# Patient Record
Sex: FEMALE | Race: WHITE | URBAN - METROPOLITAN AREA
[De-identification: names, ages, dates, MRNs, and addresses within clinical notes are randomized per-mention and may not be internally consistent; named-entity substitution may affect disease eponyms.]

---

## 2017-01-01 ENCOUNTER — OFFICE VISIT (OUTPATIENT)
Dept: INTERNAL MEDICINE | Facility: CLINIC | Age: 82
End: 2017-01-01
Payer: COMMERCIAL

## 2017-01-01 VITALS
OXYGEN SATURATION: 91 % | WEIGHT: 144.8 LBS | SYSTOLIC BLOOD PRESSURE: 112 MMHG | BODY MASS INDEX: 26.48 KG/M2 | HEART RATE: 100 BPM | DIASTOLIC BLOOD PRESSURE: 76 MMHG | TEMPERATURE: 98.2 F

## 2017-01-01 DIAGNOSIS — Z23 NEED FOR PROPHYLACTIC VACCINATION AGAINST STREPTOCOCCUS PNEUMONIAE (PNEUMOCOCCUS): ICD-10-CM

## 2017-01-01 DIAGNOSIS — I63.9 CEREBROVASCULAR ACCIDENT (CVA), UNSPECIFIED MECHANISM (H): ICD-10-CM

## 2017-01-01 DIAGNOSIS — I63.50 CEREBROVASCULAR ACCIDENT (CVA) DUE TO STENOSIS OF CEREBRAL ARTERY (H): ICD-10-CM

## 2017-01-01 DIAGNOSIS — F33.1 MAJOR DEPRESSIVE DISORDER, RECURRENT EPISODE, MODERATE (H): Primary | ICD-10-CM

## 2017-01-01 DIAGNOSIS — J61 ASBESTOSIS (H): ICD-10-CM

## 2017-01-01 DIAGNOSIS — J43.1 PANLOBULAR EMPHYSEMA (H): ICD-10-CM

## 2017-01-01 DIAGNOSIS — F51.01 PRIMARY INSOMNIA: ICD-10-CM

## 2017-01-01 DIAGNOSIS — F01.50 VASCULAR DEMENTIA WITHOUT BEHAVIORAL DISTURBANCE (H): ICD-10-CM

## 2017-01-01 DIAGNOSIS — I10 BENIGN ESSENTIAL HYPERTENSION: ICD-10-CM

## 2017-01-01 DIAGNOSIS — E78.5 HYPERLIPIDEMIA LDL GOAL <100: ICD-10-CM

## 2017-01-01 LAB
ALBUMIN SERPL-MCNC: 3.3 G/DL (ref 3.4–5)
ALP SERPL-CCNC: 81 U/L (ref 40–150)
ALT SERPL W P-5'-P-CCNC: 17 U/L (ref 0–50)
ANION GAP SERPL CALCULATED.3IONS-SCNC: 6 MMOL/L (ref 3–14)
AST SERPL W P-5'-P-CCNC: 9 U/L (ref 0–45)
BASOPHILS # BLD AUTO: 0 10E9/L (ref 0–0.2)
BASOPHILS NFR BLD AUTO: 0.3 %
BILIRUB SERPL-MCNC: 0.4 MG/DL (ref 0.2–1.3)
BUN SERPL-MCNC: 8 MG/DL (ref 7–30)
CALCIUM SERPL-MCNC: 8.6 MG/DL (ref 8.5–10.1)
CHLORIDE SERPL-SCNC: 107 MMOL/L (ref 94–109)
CHOLEST SERPL-MCNC: 130 MG/DL
CO2 SERPL-SCNC: 29 MMOL/L (ref 20–32)
CREAT SERPL-MCNC: 0.74 MG/DL (ref 0.52–1.04)
DIFFERENTIAL METHOD BLD: ABNORMAL
EOSINOPHIL # BLD AUTO: 0.2 10E9/L (ref 0–0.7)
EOSINOPHIL NFR BLD AUTO: 2.4 %
ERYTHROCYTE [DISTWIDTH] IN BLOOD BY AUTOMATED COUNT: 15.2 % (ref 10–15)
GFR SERPL CREATININE-BSD FRML MDRD: 75 ML/MIN/1.7M2
GLUCOSE SERPL-MCNC: 88 MG/DL (ref 70–99)
HCT VFR BLD AUTO: 42.9 % (ref 35–47)
HDLC SERPL-MCNC: 52 MG/DL
HGB BLD-MCNC: 13.1 G/DL (ref 11.7–15.7)
LDLC SERPL CALC-MCNC: 56 MG/DL
LYMPHOCYTES # BLD AUTO: 1.4 10E9/L (ref 0.8–5.3)
LYMPHOCYTES NFR BLD AUTO: 15.2 %
MCH RBC QN AUTO: 29.1 PG (ref 26.5–33)
MCHC RBC AUTO-ENTMCNC: 30.5 G/DL (ref 31.5–36.5)
MCV RBC AUTO: 95 FL (ref 78–100)
MONOCYTES # BLD AUTO: 0.7 10E9/L (ref 0–1.3)
MONOCYTES NFR BLD AUTO: 7.7 %
NEUTROPHILS # BLD AUTO: 7 10E9/L (ref 1.6–8.3)
NEUTROPHILS NFR BLD AUTO: 74.4 %
NONHDLC SERPL-MCNC: 78 MG/DL
PLATELET # BLD AUTO: 252 10E9/L (ref 150–450)
POTASSIUM SERPL-SCNC: 3.9 MMOL/L (ref 3.4–5.3)
PROT SERPL-MCNC: 7.1 G/DL (ref 6.8–8.8)
RBC # BLD AUTO: 4.5 10E12/L (ref 3.8–5.2)
SODIUM SERPL-SCNC: 142 MMOL/L (ref 133–144)
TRIGL SERPL-MCNC: 111 MG/DL
WBC # BLD AUTO: 9.4 10E9/L (ref 4–11)

## 2017-01-01 PROCEDURE — 36415 COLL VENOUS BLD VENIPUNCTURE: CPT | Performed by: INTERNAL MEDICINE

## 2017-01-01 PROCEDURE — 85025 COMPLETE CBC W/AUTO DIFF WBC: CPT | Performed by: INTERNAL MEDICINE

## 2017-01-01 PROCEDURE — 90732 PPSV23 VACC 2 YRS+ SUBQ/IM: CPT | Performed by: INTERNAL MEDICINE

## 2017-01-01 PROCEDURE — 80061 LIPID PANEL: CPT | Performed by: INTERNAL MEDICINE

## 2017-01-01 PROCEDURE — 80053 COMPREHEN METABOLIC PANEL: CPT | Performed by: INTERNAL MEDICINE

## 2017-01-01 PROCEDURE — G0009 ADMIN PNEUMOCOCCAL VACCINE: HCPCS | Performed by: INTERNAL MEDICINE

## 2017-01-01 PROCEDURE — 99215 OFFICE O/P EST HI 40 MIN: CPT | Mod: 25 | Performed by: INTERNAL MEDICINE

## 2017-01-01 RX ORDER — TRAZODONE HYDROCHLORIDE 100 MG/1
100 TABLET ORAL
Qty: 90 TABLET | Refills: 1 | Status: SHIPPED | OUTPATIENT
Start: 2017-01-01 | End: 2018-01-01

## 2017-01-01 RX ORDER — TIOTROPIUM BROMIDE 18 UG/1
18 CAPSULE ORAL; RESPIRATORY (INHALATION) DAILY
Qty: 90 CAPSULE | Refills: 3 | Status: SHIPPED | OUTPATIENT
Start: 2017-01-01

## 2017-01-01 RX ORDER — ALBUTEROL SULFATE 90 UG/1
2 AEROSOL, METERED RESPIRATORY (INHALATION) EVERY 4 HOURS PRN
Qty: 1 INHALER | Refills: 11 | Status: SHIPPED | OUTPATIENT
Start: 2017-01-01 | End: 2018-01-01

## 2017-01-01 RX ORDER — CITALOPRAM HYDROBROMIDE 20 MG/1
20 TABLET ORAL DAILY
Qty: 90 TABLET | Refills: 1 | Status: SHIPPED | OUTPATIENT
Start: 2017-01-01 | End: 2018-01-01

## 2017-01-01 RX ORDER — CLOPIDOGREL BISULFATE 75 MG/1
75 TABLET ORAL DAILY
Qty: 90 TABLET | Refills: 1 | Status: SHIPPED | OUTPATIENT
Start: 2017-01-01 | End: 2018-01-01

## 2017-01-01 RX ORDER — SIMVASTATIN 40 MG
40 TABLET ORAL AT BEDTIME
Qty: 90 TABLET | Refills: 3 | Status: SHIPPED | OUTPATIENT
Start: 2017-01-01 | End: 2018-01-01

## 2017-01-01 RX ORDER — MIRTAZAPINE 15 MG/1
15 TABLET, FILM COATED ORAL AT BEDTIME
Qty: 90 TABLET | Refills: 3 | Status: SHIPPED | OUTPATIENT
Start: 2017-01-01 | End: 2018-01-01

## 2017-01-01 RX ORDER — METOPROLOL SUCCINATE 25 MG/1
25 TABLET, EXTENDED RELEASE ORAL DAILY
Qty: 90 TABLET | Refills: 1 | Status: SHIPPED | OUTPATIENT
Start: 2017-01-01 | End: 2018-01-01

## 2017-01-01 ASSESSMENT — PATIENT HEALTH QUESTIONNAIRE - PHQ9
SUM OF ALL RESPONSES TO PHQ QUESTIONS 1-9: 6

## 2017-01-05 DIAGNOSIS — E78.5 HYPERLIPIDEMIA LDL GOAL <100: ICD-10-CM

## 2017-01-05 DIAGNOSIS — I63.50 CEREBROVASCULAR ACCIDENT (CVA) DUE TO STENOSIS OF CEREBRAL ARTERY (H): Primary | ICD-10-CM

## 2017-01-05 NOTE — TELEPHONE ENCOUNTER
Zocor 40mg     Last Written Prescription Date: 2/29/16  Last Fill Quantity: 90, # refills: 1  Last Office Visit with [facility], P or [facility] prescribing provider: 2/29/16       CHOL      125   2/29/2016  HDL       43   2/29/2016  LDL       59   2/29/2016  TRIG      114   2/29/2016  CHOLHDLRATIO      2.9   2/24/2015            Thanks,  Rocio Jordan, Technician (manas)  [facility]

## 2017-01-06 RX ORDER — SIMVASTATIN 40 MG
40 TABLET ORAL AT BEDTIME
Qty: 90 TABLET | Refills: 0 | Status: SHIPPED | OUTPATIENT
Start: 2017-01-06 | End: 2017-01-17

## 2017-01-06 NOTE — TELEPHONE ENCOUNTER
Medication is being filled for 1 time refill only due to:  Patient needs labs FLP.     [facility]   [phone]

## 2017-01-17 ENCOUNTER — OFFICE VISIT (OUTPATIENT)
Dept: INTERNAL MEDICINE | Facility: CLINIC | Age: 82
End: 2017-01-17
Payer: COMMERCIAL

## 2017-01-17 VITALS
BODY MASS INDEX: 26.89 KG/M2 | DIASTOLIC BLOOD PRESSURE: 84 MMHG | TEMPERATURE: 98.2 F | SYSTOLIC BLOOD PRESSURE: 110 MMHG | HEART RATE: 94 BPM | WEIGHT: 146.1 LBS | OXYGEN SATURATION: 93 % | HEIGHT: 62 IN

## 2017-01-17 DIAGNOSIS — F33.1 MAJOR DEPRESSIVE DISORDER, RECURRENT EPISODE, MODERATE (H): ICD-10-CM

## 2017-01-17 DIAGNOSIS — E78.5 HYPERLIPIDEMIA LDL GOAL <100: ICD-10-CM

## 2017-01-17 DIAGNOSIS — F01.50 VASCULAR DEMENTIA WITHOUT BEHAVIORAL DISTURBANCE (H): ICD-10-CM

## 2017-01-17 DIAGNOSIS — I10 BENIGN ESSENTIAL HYPERTENSION: ICD-10-CM

## 2017-01-17 DIAGNOSIS — F51.01 PRIMARY INSOMNIA: ICD-10-CM

## 2017-01-17 DIAGNOSIS — J61 ASBESTOSIS (H): ICD-10-CM

## 2017-01-17 DIAGNOSIS — Z23 NEED FOR SHINGLES VACCINE: ICD-10-CM

## 2017-01-17 DIAGNOSIS — I63.50 CEREBROVASCULAR ACCIDENT (CVA) DUE TO STENOSIS OF CEREBRAL ARTERY (H): Primary | ICD-10-CM

## 2017-01-17 DIAGNOSIS — J43.1 PANLOBULAR EMPHYSEMA (H): ICD-10-CM

## 2017-01-17 DIAGNOSIS — Z13.29 SCREENING FOR THYROID DISORDER: ICD-10-CM

## 2017-01-17 LAB
ALBUMIN SERPL-MCNC: 3.6 G/DL (ref 3.4–5)
ALP SERPL-CCNC: 83 U/L (ref 40–150)
ALT SERPL W P-5'-P-CCNC: 28 U/L (ref 0–50)
ANION GAP SERPL CALCULATED.3IONS-SCNC: 9 MMOL/L (ref 3–14)
AST SERPL W P-5'-P-CCNC: 17 U/L (ref 0–45)
BASOPHILS # BLD AUTO: 0 10E9/L (ref 0–0.2)
BASOPHILS NFR BLD AUTO: 0.2 %
BILIRUB SERPL-MCNC: 0.3 MG/DL (ref 0.2–1.3)
BUN SERPL-MCNC: 11 MG/DL (ref 7–30)
CALCIUM SERPL-MCNC: 8.9 MG/DL (ref 8.5–10.1)
CHLORIDE SERPL-SCNC: 107 MMOL/L (ref 94–109)
CHOLEST SERPL-MCNC: 150 MG/DL
CO2 SERPL-SCNC: 28 MMOL/L (ref 20–32)
CREAT SERPL-MCNC: 0.58 MG/DL (ref 0.52–1.04)
DIFFERENTIAL METHOD BLD: ABNORMAL
EOSINOPHIL # BLD AUTO: 0.2 10E9/L (ref 0–0.7)
EOSINOPHIL NFR BLD AUTO: 1.4 %
ERYTHROCYTE [DISTWIDTH] IN BLOOD BY AUTOMATED COUNT: 15.9 % (ref 10–15)
GFR SERPL CREATININE-BSD FRML MDRD: NORMAL ML/MIN/1.7M2
GLUCOSE SERPL-MCNC: 96 MG/DL (ref 70–99)
HCT VFR BLD AUTO: 44.2 % (ref 35–47)
HDLC SERPL-MCNC: 51 MG/DL
HGB BLD-MCNC: 13.9 G/DL (ref 11.7–15.7)
LDLC SERPL CALC-MCNC: 65 MG/DL
LYMPHOCYTES # BLD AUTO: 1.9 10E9/L (ref 0.8–5.3)
LYMPHOCYTES NFR BLD AUTO: 15.6 %
MCH RBC QN AUTO: 29 PG (ref 26.5–33)
MCHC RBC AUTO-ENTMCNC: 31.4 G/DL (ref 31.5–36.5)
MCV RBC AUTO: 92 FL (ref 78–100)
MONOCYTES # BLD AUTO: 1.1 10E9/L (ref 0–1.3)
MONOCYTES NFR BLD AUTO: 8.9 %
NEUTROPHILS # BLD AUTO: 9.2 10E9/L (ref 1.6–8.3)
NEUTROPHILS NFR BLD AUTO: 73.9 %
NONHDLC SERPL-MCNC: 99 MG/DL
PLATELET # BLD AUTO: 306 10E9/L (ref 150–450)
POTASSIUM SERPL-SCNC: 3.7 MMOL/L (ref 3.4–5.3)
PROT SERPL-MCNC: 7.6 G/DL (ref 6.8–8.8)
RBC # BLD AUTO: 4.8 10E12/L (ref 3.8–5.2)
SODIUM SERPL-SCNC: 144 MMOL/L (ref 133–144)
TRIGL SERPL-MCNC: 168 MG/DL
TSH SERPL DL<=0.005 MIU/L-ACNC: 3.92 MU/L (ref 0.4–4)
WBC # BLD AUTO: 12.4 10E9/L (ref 4–11)

## 2017-01-17 PROCEDURE — 80053 COMPREHEN METABOLIC PANEL: CPT | Performed by: INTERNAL MEDICINE

## 2017-01-17 PROCEDURE — 99214 OFFICE O/P EST MOD 30 MIN: CPT | Performed by: INTERNAL MEDICINE

## 2017-01-17 PROCEDURE — 80061 LIPID PANEL: CPT | Performed by: INTERNAL MEDICINE

## 2017-01-17 PROCEDURE — 36415 COLL VENOUS BLD VENIPUNCTURE: CPT | Performed by: INTERNAL MEDICINE

## 2017-01-17 PROCEDURE — 85025 COMPLETE CBC W/AUTO DIFF WBC: CPT | Performed by: INTERNAL MEDICINE

## 2017-01-17 PROCEDURE — 84443 ASSAY THYROID STIM HORMONE: CPT | Performed by: INTERNAL MEDICINE

## 2017-01-17 RX ORDER — METOPROLOL SUCCINATE 25 MG/1
25 TABLET, EXTENDED RELEASE ORAL DAILY
Qty: 90 TABLET | Refills: 3 | Status: SHIPPED | OUTPATIENT
Start: 2017-01-17 | End: 2017-01-01

## 2017-01-17 RX ORDER — CLOPIDOGREL BISULFATE 75 MG/1
75 TABLET ORAL DAILY
Qty: 90 TABLET | Refills: 3 | Status: SHIPPED | OUTPATIENT
Start: 2017-01-17 | End: 2017-01-01

## 2017-01-17 RX ORDER — MIRTAZAPINE 15 MG/1
15 TABLET, FILM COATED ORAL AT BEDTIME
Qty: 90 TABLET | Refills: 3 | Status: SHIPPED | OUTPATIENT
Start: 2017-01-17 | End: 2017-01-01

## 2017-01-17 RX ORDER — SIMVASTATIN 40 MG
40 TABLET ORAL AT BEDTIME
Qty: 90 TABLET | Refills: 3 | Status: SHIPPED | OUTPATIENT
Start: 2017-01-17 | End: 2017-01-01

## 2017-01-17 RX ORDER — TIOTROPIUM BROMIDE 18 UG/1
18 CAPSULE ORAL; RESPIRATORY (INHALATION) DAILY
Qty: 90 CAPSULE | Refills: 3 | Status: SHIPPED | OUTPATIENT
Start: 2017-01-17 | End: 2017-01-01

## 2017-01-17 RX ORDER — CITALOPRAM HYDROBROMIDE 20 MG/1
20 TABLET ORAL DAILY
Qty: 90 TABLET | Refills: 3 | Status: SHIPPED | OUTPATIENT
Start: 2017-01-17 | End: 2017-01-01

## 2017-01-17 RX ORDER — ALBUTEROL SULFATE 90 UG/1
2 AEROSOL, METERED RESPIRATORY (INHALATION) EVERY 4 HOURS PRN
Qty: 1 INHALER | Refills: 11 | Status: SHIPPED | OUTPATIENT
Start: 2017-01-17 | End: 2017-01-01

## 2017-01-17 RX ORDER — TRAZODONE HYDROCHLORIDE 100 MG/1
100 TABLET ORAL
Qty: 90 TABLET | Refills: 1 | Status: SHIPPED | OUTPATIENT
Start: 2017-01-17 | End: 2017-01-01

## 2017-01-17 NOTE — PATIENT INSTRUCTIONS
"*  Continue all medications at the same doses.  Contact your usual pharmacy if you need refills.     *  Use the Advair inhaler at least once per day    *  Shingles vaccine today.     *  Use the albuterol inhaler as needed for any shortness of breath or before going outside for any field trips.        5 GOALS TO PREVENT VASCULAR DISEASE:     1.  Aggressive blood pressure control, under 130/80 ideally.  Using medications if needed.    Your blood pressure is under good control    BP Readings from Last 4 Encounters:   01/17/17 110/84   02/29/16 90/58   10/07/15 114/76   02/24/15 128/88       2.  Aggressive LDL cholesterol (\"bad cholesterol\") lowering as indicated.    Your goal is an LDL under 130 for sure, preferably under 100.  (If you have diabetes or previous vascular disease, the the LDL goals would be under 100 for sure, preferably under 70.)    New guidelines identify four high-risk groups who could benefit from statins:   *people with pre-existing heart disease, such as those who have had a heart attack;   *people ages 40 to 75 who have diabetes of any type  *patients ages 40 to 75 with at least a 7.5% risk of developing cardiovascular disease over the next decade, according to a formula described in the guidelines  *patients with the sort of super-high cholesterol that sometimes runs in families, as evidenced by an LDL of 190 milligrams per deciliter or higher    Your cholesterol levels are well controlled.    Recent Labs   Lab Test  02/29/16   0946  02/24/15   1134  02/10/14   0852   CHOL  125  136  165   HDL  43*  47*  51   LDL  59  55  71   TRIG  114  172*  216*   CHOLHDLRATIO   --   2.9  3.3       3.  Aggressive diabetic prevention, screening and/or management.      You do not have diabetes as of the most recent blood tests.     4.  No smoking    5.  Consider taking low dose aspirin (81 mg) tablet once per day over the age of 50, every day unless there is a specific reason that you cannot take aspirin (such " "as side effect, allergy, or you are on another \"blood thinner\").        --Based on your current cardiac risk factors, you do NOT need to take Aspirin, you are taking Plavix.             "

## 2017-01-17 NOTE — MR AVS SNAPSHOT
"              After Visit Summary   1/17/2017    Maribeth Fowler    MRN: [id number]           Patient Information     Date Of Birth          8/26/1934        Visit Information        Provider Department      1/17/2017 8:40 AM Ronnell Lomax MD [facility]        Today's Diagnoses     Cerebrovascular accident (CVA) due to stenosis of cerebral artery (H)    -  1     Hyperlipidemia LDL goal <100         Vascular dementia without behavioral disturbance         Benign essential hypertension         Major depressive disorder, recurrent episode, moderate (H)         Primary insomnia         Panlobular emphysema (H)         Asbestosis (H)         Screening for thyroid disorder         Aphasia due to stroke (H)         Need for shingles vaccine           Care Instructions    *  Continue all medications at the same doses.  Contact your usual pharmacy if you need refills.     *  Use the Advair inhaler at least once per day    *  Shingles vaccine today.     *  Use the albuterol inhaler as needed for any shortness of breath or before going outside for any field trips.        5 GOALS TO PREVENT VASCULAR DISEASE:     1.  Aggressive blood pressure control, under 130/80 ideally.  Using medications if needed.    Your blood pressure is under good control    BP Readings from Last 4 Encounters:   01/17/17 110/84   02/29/16 90/58   10/07/15 114/76   02/24/15 128/88       2.  Aggressive LDL cholesterol (\"bad cholesterol\") lowering as indicated.    Your goal is an LDL under 130 for sure, preferably under 100.  (If you have diabetes or previous vascular disease, the the LDL goals would be under 100 for sure, preferably under 70.)    New guidelines identify four high-risk groups who could benefit from statins:   *people with pre-existing heart disease, such as those who have had a heart attack;   *people ages 40 to 75 who have diabetes of any type  *patients ages 40 to 75 with at least a 7.5% risk of " "developing cardiovascular disease over the next decade, according to a formula described in the guidelines  *patients with the sort of super-high cholesterol that sometimes runs in families, as evidenced by an LDL of 190 milligrams per deciliter or higher    Your cholesterol levels are well controlled.    Recent Labs   Lab Test  02/29/16   0946  02/24/15   1134  02/10/14   0852   CHOL  125  136  165   HDL  43*  47*  51   LDL  59  55  71   TRIG  114  172*  216*   CHOLHDLRATIO   --   2.9  3.3       3.  Aggressive diabetic prevention, screening and/or management.      You do not have diabetes as of the most recent blood tests.     4.  No smoking    5.  Consider taking low dose aspirin (81 mg) tablet once per day over the age of 50, every day unless there is a specific reason that you cannot take aspirin (such as side effect, allergy, or you are on another \"blood thinner\").        --Based on your current cardiac risk factors, you do NOT need to take Aspirin, you are taking Plavix.                   Follow-ups after your visit        Who to contact     If you have questions or need follow up information about today's clinic visit or your schedule please contact [facility] directly at [phone].  Normal or non-critical lab and imaging results will be communicated to you by [facility], letter or phone within 4 business days after the clinic has received the results. If you do not hear from us within 7 days, please contact the clinic through [facility] or phone. If you have a critical or abnormal lab result, we will notify you by phone as soon as possible.  Submit refill requests through [facility] or call your pharmacy and they will forward the refill request to us. Please allow 3 business days for your refill to be completed.          Additional Information About Your Visit        [employer] Information     [employer] lets you send messages to your doctor, view your test results, renew your prescriptions, " "schedule appointments and more. To sign up, go to [email or web address] . Click on \"Log in\" on the left side of the screen, which will take you to the Welcome page. Then click on \"Sign up Now\" on the right side of the page.     You will be asked to enter the access code listed below, as well as some personal information. Please follow the directions to create your username and password.     Your access code is: [email or web address]  Expires: 2017  9:35 AM     Your access code will  in 90 days. If you need help or a new code, please call your [place] clinic or [phone].        Care EveryWhere ID     This is your Care EveryWhere ID. This could be used by other organizations to access your [place] medical records  [id number]        Your Vitals Were     Pulse Temperature Height BMI (Body Mass Index) Pulse Oximetry       94 98.2  F (36.8  C) (Oral) 5' 2\" (1.575 m) 26.72 kg/m2 93%        Blood Pressure from Last 3 Encounters:   17 110/84   16 90/58   10/07/15 114/76    Weight from Last 3 Encounters:   17 146 lb 1.6 oz (66.271 kg)   16 152 lb 14.4 oz (69.355 kg)   10/07/15 161 lb 11.2 oz (73.347 kg)              We Performed the Following     CBC with platelets and differential     Comprehensive metabolic panel (BMP + Alb, Alk Phos, ALT, AST, Total. Bili, TP)     Lipid Profile with reflex to direct LDL     TSH with free T4 reflex          Today's Medication Changes          These changes are accurate as of: 17  9:35 AM.  If you have any questions, ask your nurse or doctor.               Start taking these medicines.        Dose/Directions    zoster vaccine live (PF) injection   Commonly known as:  ZOSTAVAX   Used for:  Need for shingles vaccine   Started by:  Ronnell Lomax MD        Dose:  1 each   Inject 0.65 mLs Subcutaneous once for 1 dose   Quantity:  0.65 mL   Refills:  0            Where to get your medicines      These medications were sent to [facility] " [facility] - [place], [place] - [address]  [address] [address]     Phone:  [phone]    - albuterol 108 (90 BASE) MCG/ACT Inhaler  - citalopram 20 MG tablet  - clopidogrel 75 MG tablet  - fluticasone-salmeterol 250-50 MCG/DOSE diskus inhaler  - metoprolol 25 MG 24 hr tablet  - mirtazapine 15 MG tablet  - simvastatin 40 MG tablet  - tiotropium 18 MCG capsule  - traZODone 100 MG tablet  - zoster vaccine live (PF) injection             Primary Care Provider Office Phone # Fax #    Ronnell Lomax [phone]       [facility] [address] [facility] [address]        Thank you!     Thank you for choosing [facility]  for your care. Our goal is always to provide you with excellent care. Hearing back from our patients is one way we can continue to improve our services. Please take a few minutes to complete the written survey that you may receive in the mail after your visit with us. Thank you!             Your Updated Medication List - Protect others around you: Learn how to safely use, store and throw away your medicines at www.disposemymeds.org.          This list is accurate as of: 1/17/17  9:35 AM.  Always use your most recent med list.                   Brand Name Dispense Instructions for use    albuterol 108 (90 BASE) MCG/ACT Inhaler    PROAIR HFA/PROVENTIL HFA/VENTOLIN HFA    1 Inhaler    Inhale 2 puffs into the lungs every 4 hours as needed       * ASPIRIN NOT PRESCRIBED    INTENTIONAL    0 each    Antiplatelet medication not prescribed intentionally due to Plavix       Bran Fiber 500 MG Tabs          citalopram 20 MG tablet    celeXA    90 tablet    Take 1 tablet (20 mg) by mouth daily       clopidogrel 75 MG tablet    PLAVIX    90 tablet    Take 1 tablet (75 mg) by mouth daily       fluticasone-salmeterol 250-50 MCG/DOSE diskus inhaler    ADVAIR DISKUS    3 Inhaler    Inhale 1 puff into the lungs 2 times daily        metoprolol 25 MG 24 hr tablet    TOPROL-XL    90 tablet    Take 1 tablet (25 mg) by mouth daily       mirtazapine 15 MG tablet    REMERON    90 tablet    Take 1 tablet (15 mg) by mouth At Bedtime       Multi-vitamin Tabs tablet   Generic drug:  multivitamin, therapeutic with minerals      Take 1 tablet by mouth daily.       * order for DME     1 each    Wheeled walker       order for DME     1 each    Equipment being ordered: Oxygen       PRILOSEC PO      Take 20 mg by mouth       simvastatin 40 MG tablet    ZOCOR    90 tablet    Take 1 tablet (40 mg) by mouth At Bedtime       tiotropium 18 MCG capsule    SPIRIVA HANDIHALER    90 capsule    Inhale 1 capsule (18 mcg) into the lungs daily Inhale contents of one capsule daily.       traZODone 100 MG tablet    DESYREL    90 tablet    Take 1 tablet (100 mg) by mouth nightly as needed for sleep       vitamin D 2000 UNITS tablet      Take 2,000 Units by mouth daily       zoster vaccine live (PF) injection    ZOSTAVAX    0.65 mL    Inject 0.65 mLs Subcutaneous once for 1 dose       * Notice:  This list has 2 medication(s) that are the same as other medications prescribed for you. Read the directions carefully, and ask your doctor or other care provider to review them with you.

## 2017-01-17 NOTE — NURSING NOTE
"Chief Complaint   Patient presents with     Hypertension     med refill     Lipids     med refill     COPD     med refill     Depression     med refill       Initial /84 mmHg  Pulse 94  Temp(Src) 98.2  F (36.8  C) (Oral)  Ht 5' 2\" (1.575 m)  Wt 146 lb 1.6 oz (66.271 kg)  BMI 26.72 kg/m2  SpO2 93% Estimated body mass index is 26.72 kg/(m^2) as calculated from the following:    Height as of this encounter: 5' 2\" (1.575 m).    Weight as of this encounter: 146 lb 1.6 oz (66.271 kg).  BP completed using cuff size: regular    "

## 2017-01-17 NOTE — PROGRESS NOTES
SUBJECTIVE:                                                    Maribeth Fowler is a 82 year old female who presents to clinic today for the following health issues:      Hyperlipidemia Follow-Up      Rate your low fat/cholesterol diet?: poor    Taking statin?  Yes, no muscle aches from statin    Other lipid medications/supplements?:  None    Has history of hyperlipidemia.  On statin for this, denies any significant side effects of this medication.      Latest labs reviewed:    Recent Labs   Lab Test  02/29/16   0946  02/24/15   1134  02/10/14   0852   CHOL  125  136  165   HDL  43*  47*  51   LDL  59  55  71   TRIG  114  172*  216*   CHOLHDLRATIO   --   2.9  3.3        AST       17   2/29/2016       Hypertension Follow-up      Outpatient blood pressures are not being checked.    Low Salt Diet: no added salt  Blood presure remains well controlled at home  Readings outside clinic are within normal limits.  Reviewed last 6 BP readings in chart:  BP Readings from Last 6 Encounters:   01/17/17 110/84   02/29/16 90/58   10/07/15 114/76   02/24/15 128/88   09/18/14 118/70   08/14/14 130/70     He has not experienced any significant side effects from medicaiotns for hypertension.    NO active cardiac complaints or symptoms with exercise.      Depression Followup    Status since last visit: Stable     See PHQ-9 for current symptoms.  Other associated symptoms: over sleeping     Complicating factors:   Significant life event:  No   Current substance abuse:  None  Anxiety or Panic symptoms:  No    PHQ-9  English PHQ-9   Any Language          COPD Follow-Up    Symptoms are currently: stable    Current fatigue or dyspnea with ambulation: none    Shortness of breath: worse but with oxygen it is better     Increased or change in Cough/Sputum: No    Fever(s): No    Baseline ambulation without stopping to rest an eighth   blocks. Able to walk up 0 flights of stairs without stopping to rest.    Any ER/UC or hospital admissions  "since your last visit? No     Is not using Advair regularly for no particular reason, only \"occasionally\".  Does not use the albuterol MDI because she has not felt like she needed it.   Uses spiriva every day.   on chronic O2.      History   Smoking status     Former Smoker -- 0.50 packs/day for 52 years     Types: Cigarettes     Quit date: 09/01/2013   Smokeless tobacco     Never Used     No results found for this basename: FEV1, OCI2QRN       4.  history of CVA.  Since that time, has occ ataxia and unsteadiness that has remained stable.  Uses walker.   Has occ word findings difficulties per daughter, possibly a bit more than the past year, but not enough to cause troubles.   Short term memory worsening per patient and daughter.  She lives in senior apartment in [place] that has services eventually available when needed.       Amount of exercise or physical activity: None    Problems taking medications regularly: No    Medication side effects: none    Diet: low salt        Problem list and histories reviewed & adjusted, as indicated.  Additional history: as documented          Past Medical History:  ---------------------------  Past Medical History   Diagnosis Date     Calculus of kidney      Unspecified arthropathy, pelvic region and thigh      Chronic airway obstruction, not elsewhere classified      Asbestosis(501)      Depressive disorder, not elsewhere classified      Essential hypertension, benign      Esophageal reflux      Other convulsions 2004     seizure since stroke     Other and unspecified hyperlipidemia 10/04      prior to treatment     Chronic airway obstruction, not elsewhere classified      Other generalized ischemic cerebrovascular disease 2004     acute right thalamic infarct per MRI     Unspecified cerebral artery occlusion with cerebral infarction      Gastro-oesophageal reflux disease      Arthritis      Tobacco use disorder      Vitamin D deficiency 9/12/12     vitamin D 25     " Vascular dementia      per Neurology evaluation     Stroke (H) 2004     acute right thalamic infarct per MRI       Past Surgical History:  ---------------------------  Past Surgical History   Procedure Laterality Date     C nonspecific procedure       T&A     Hysterectomy, cervix status unknown       partial hysterectomy (secondary to fibroids)     C nonspecific procedure       urethral stone removal     C nonspecific procedure  6/99     Left hip JESUS     Back surgery       cervical spine repair     Arthroplasty hip  4/23/2012     Procedure:ARTHROPLASTY HIP; RIGHT TOTAL HIP ARTHROPLASTY ; Surgeon:YONIS DUFFY; Location:[facility] OR     Hysterectomy, pap no longer indicated         Current Medications:  ---------------------------  Current Outpatient Prescriptions   Medication Sig Dispense Refill     simvastatin (ZOCOR) 40 MG tablet Take 1 tablet (40 mg) by mouth At Bedtime 90 tablet 3     mirtazapine (REMERON) 15 MG tablet Take 1 tablet (15 mg) by mouth At Bedtime 90 tablet 3     metoprolol (TOPROL-XL) 25 MG 24 hr tablet Take 1 tablet (25 mg) by mouth daily 90 tablet 3     citalopram (CELEXA) 20 MG tablet Take 1 tablet (20 mg) by mouth daily 90 tablet 3     traZODone (DESYREL) 100 MG tablet Take 1 tablet (100 mg) by mouth nightly as needed for sleep 90 tablet 1     fluticasone-salmeterol (ADVAIR DISKUS) 250-50 MCG/DOSE diskus inhaler Inhale 1 puff into the lungs 2 times daily 3 Inhaler 3     tiotropium (SPIRIVA HANDIHALER) 18 MCG capsule Inhale 1 capsule (18 mcg) into the lungs daily Inhale contents of one capsule daily. 90 capsule 3     clopidogrel (PLAVIX) 75 MG tablet Take 1 tablet (75 mg) by mouth daily 90 tablet 3     zoster vaccine live, PF, (ZOSTAVAX) injection Inject 0.65 mLs Subcutaneous once for 1 dose 0.65 mL 0     albuterol (PROAIR HFA/PROVENTIL HFA/VENTOLIN HFA) 108 (90 BASE) MCG/ACT Inhaler Inhale 2 puffs into the lungs every 4 hours as needed 1 Inhaler 11     [DISCONTINUED] simvastatin (ZOCOR) 40  MG tablet Take 1 tablet (40 mg) by mouth At Bedtime 90 tablet 0     [DISCONTINUED] mirtazapine (REMERON) 15 MG tablet Take 1 tablet (15 mg) by mouth At Bedtime 90 tablet 0     [DISCONTINUED] metoprolol (TOPROL-XL) 25 MG 24 hr tablet Take 1 tablet (25 mg) by mouth daily 90 tablet 1     [DISCONTINUED] citalopram (CELEXA) 20 MG tablet Take 1 tablet (20 mg) by mouth daily 90 tablet 1     [DISCONTINUED] traZODone (DESYREL) 100 MG tablet Take 1 tablet (100 mg) by mouth nightly as needed for sleep 90 tablet 1     [DISCONTINUED] fluticasone-salmeterol (ADVAIR DISKUS) 250-50 MCG/DOSE diskus inhaler Inhale 1 puff into the lungs 2 times daily 3 Inhaler 1     [DISCONTINUED] tiotropium (SPIRIVA HANDIHALER) 18 MCG inhalation capsule Inhale 1 capsule (18 mcg) into the lungs daily Inhale contents of one capsule daily. 90 capsule 1     [DISCONTINUED] clopidogrel (PLAVIX) 75 MG tablet Take 1 tablet (75 mg) by mouth daily 90 tablet 1     Bran Fiber 500 MG TABS        Omeprazole (PRILOSEC PO) Take 20 mg by mouth       [DISCONTINUED] albuterol (PROAIR HFA, PROVENTIL HFA, VENTOLIN HFA) 108 (90 BASE) MCG/ACT inhaler Inhale 2 puffs into the lungs every 4 hours as needed 1 Inhaler 11     ORDER FOR DME Equipment being ordered: Oxygen 1 each 0     Cholecalciferol (VITAMIN D) 2000 UNITS tablet Take 2,000 Units by mouth daily       ASPIRIN NOT PRESCRIBED, INTENTIONAL, Antiplatelet medication not prescribed intentionally due to Plavix 0 each      Multiple Vitamin (MULTI-VITAMIN) per tablet Take 1 tablet by mouth daily.       ORDER FOR DME Wheeled walker 1 each 0       Allergies:  -------------  Allergies   Allergen Reactions     No Known Allergies        Social History:  -------------------  Social History     Social History     Marital Status: Single     Spouse Name: N/A     Number of Children: N/A     Years of Education: N/A     Occupational History     Not on file.     Social History Main Topics     Smoking status: Former Smoker -- 0.50  "packs/day for 52 years     Types: Cigarettes     Quit date: 09/01/2013     Smokeless tobacco: Never Used     Alcohol Use: 0.0 oz/week     0 Standard drinks or equivalent per week      Comment: rare-- glass of wine     Drug Use: No     Sexual Activity: Not Currently     Other Topics Concern     Not on file     Social History Narrative       Family Medical History:  ------------------------------  Family History   Problem Relation Age of Onset     CANCER Mother      D:79  lung ca     CANCER Father      D:  72  bone ca     DIABETES Brother      type 2     Cardiovascular Brother      DIABETES Brother      type 2     CEREBROVASCULAR DISEASE Brother          ROS:  REVIEW OF SYSTEMS:    RESP: positive for cough, dyspnea, wheezing; negative for hemoptysis   CV: negative for chest pain, palpitations, PND, ALFORD, orthopnea;   GI: negative for dysphagia, N/V, pain, melena, diarrhea and constipation  NEURO: negative for numbness/tingling, paralysis, incoordination, or focal weakness; POS for declinig short term memory and occ word finding problems    OBJECTIVE:                                                    /84 mmHg  Pulse 94  Temp(Src) 98.2  F (36.8  C) (Oral)  Ht 5' 2\" (1.575 m)  Wt 146 lb 1.6 oz (66.271 kg)  BMI 26.72 kg/m2  SpO2 93%     GENERAL alert and no distress  EYES:  Normal sclera,conjunctiva, EOMI  HENT: oral and posterior pharynx without lesions or erythema, facies symmetric  NECK: Neck supple. No LAD, without thyroidmegaly or JVD., Carotids without bruits.  RESP: breath sounds quiet but clear, diminished respiratory excursion, prolonged expiratory phase,  no rhonci, few scattered faint end exp wheezes, no rales .  CV: RRR normal S1S2 without murmurs, rubs or gallops. PMI normal  LYMPH: no cervical lymph adenopathy appreciated  MS: extremities- no gross deformities of the visible extremities noted, no edema  PSYCH: Alert and oriented times 3; speech- coherent  SKIN:  No obvious significant skin " lesions on visible portions of face          ASSESSMENT/PLAN:                                                      (I63.50) Cerebrovascular accident (CVA) due to stenosis of cerebral artery (H)  (primary encounter diagnosis)  Comment: no new symptoms.   Discussed secondary risk factor modification and recommended continuing aggressive management of these items.   Plan: CBC with platelets and differential,         Comprehensive metabolic panel (BMP + Alb, Alk         Phos, ALT, AST, Total. Bili, TP), Lipid Profile        with reflex to direct LDL, simvastatin (ZOCOR)         40 MG tablet, clopidogrel (PLAVIX) 75 MG tablet            (E78.5) Hyperlipidemia LDL goal <100  Comment: Discussed new guidelines recommending a statin cholesterol lowering medication for any patient with either diabetes and/or vascular disease, aiming for a LDL goal under 100 for sure, ideally under 70.    Reviewed statins and their side effects including muscle pain, muscle inflammation, GI upset.  Told the patient to stop the medication in question and to call if any side effects develop.   Recommended CoQ10 200-300 mg at the same time as taking the statin medication to help reduce the chance of muscle side effects from the statin.    Plan: Comprehensive metabolic panel (BMP + Alb, Alk         Phos, ALT, AST, Total. Bili, TP), Lipid Profile        with reflex to direct LDL, simvastatin (ZOCOR)         40 MG tablet            (F01.50) Vascular dementia without behavioral disturbance  Comment: slightl progression per patient and daiughter since last visit.   Discussed typical progression of dementia symptoms.  She still sounds table in her current living environment.   She can get more services as needed at her location.   Plan: mirtazapine (REMERON) 15 MG tablet            (I10) Benign essential hypertension  Comment: This condition is currently controlled on the current medical regimen.  Continue current therapy.   Discussed hypertension in  detail including JNC VIII guidelines for blood pressure goals.  Discussed indication for treatment and treatment options.  Discussed the importance for aggressive management of HTN to prevent vascular complications later.  Recommended lower fat, lower carbohydrate, and lower sodium (<2000 mg)diet.  Discussed required intervals for follow up on HTN, lab studies, and the need to aggresive management of other cardiac disease risk factors.  Recommened pt. follow their blood pressures outside the clinic to ensure that BPs are remaining within guidelines, and to contact me if the readings are not within guidelines so we can adjust treatment as needed.   Plan: CBC with platelets and differential,         Comprehensive metabolic panel (BMP + Alb, Alk         Phos, ALT, AST, Total. Bili, TP), metoprolol         (TOPROL-XL) 25 MG 24 hr tablet            (F33.1) Major depressive disorder, recurrent episode, moderate (H)  Comment: This condition is currently controlled on the current medical regimen.  Continue current therapy.   Plan: TSH with free T4 reflex, citalopram (CELEXA) 20        MG tablet            (F51.01) Primary insomnia  Comment: This condition is currently controlled on the current medical regimen.  Continue current therapy.   Plan: traZODone (DESYREL) 100 MG tablet            (J43.1) Panlobular emphysema (H)  Comment: using spiriva every day, would strongly recommend that she use the advair more reguarl,y at least once per day.   Use the albuteorl more often before leaving her place.   Continue home O2.   Discussed general issues of COPD, including pathophysiology, ways it will affect the pt., when to seek help, reviewed the typical medications (how they are taken, how they help)   Plan: fluticasone-salmeterol (ADVAIR DISKUS) 250-50         MCG/DOSE diskus inhaler, tiotropium (SPIRIVA         HANDIHALER) 18 MCG capsule, albuterol (PROAIR         HFA/PROVENTIL HFA/VENTOLIN HFA) 108 (90 BASE)         MCG/ACT  Inhaler            (J61) Asbestosis (H)  Comment: maurice.   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   Plan:     (Z23) Need for shingles vaccine  Comment:   Plan: zoster vaccine live, PF, (ZOSTAVAX) injection              See Patient Instructions    LEON BUSTAMANTE M.D., MD  [facility]

## 2017-01-17 NOTE — Clinical Note
[address]  [address]  [phone]        Maribeth Fowler  [address] [facility] [address]      1/20/2017      Dear Ms. Maribeth Fowler:    I am writing to inform you of the results of the laboratory tests you had done recently.    Total Cholesterol: CHOL      150   1/17/2017       (Recommended: below 200)        HDL (good) Cholesterol : HDL       51   1/17/2017      (Recommended: 40 or more)  LDL (bad) Cholesterol:  LDL       65   1/17/2017       (Recommended: below 130, below 100 if heart disease or diabetes is diagnosed)   Triglycerides:  TRIG      168   1/17/2017    (Recommended: below 180)  Non HDL cholesterol (Cholesterol ratio: CHOLHDLRATIO      2.9   2/24/2015  NHDL               99   1/17/2017  (Ideally below 130, Acceptable below 160).    Additional results of your recent labs are as noted.   Liver function: NORMAL  Kidney  function: NORMAL  Hemoglobin: NORMAL  Thyroid function: NORMAL  Electrolytes: NORMAL  Glucose: NORMAL    Your labs all looked OK.  Continue all medications at the same doses.  Contact your usual pharmacy if you need refills.     Thank you for allowing me to participate in your care. If you have any further questions or problems, please contact me via our nurse line at [phone]    Sincerely,          Ronnell Lomax M.D.  Department of Internal Medicine  [facility]

## 2017-01-17 NOTE — Clinical Note
My Depression Action Plan  Name: Maribeth Fowler   Date of Birth 8/26/1934  Date: 1/17/2017    My doctor: Ronnell Lomax   My clinic: [address] [address] [phone]          GREEN    ZONE   Good Control    What it looks like:     Things are going generally well. You have normal up s and down s. You may even feel depressed from time to time, but bad moods usually last less than a day.   What you need to do:  1. Continue to care for yourself (see self care plan)  2. Check your depression survival kit and update it as needed  3. Follow your physician s recommendations including any medication.  4. Do not stop taking medication unless you consult with your physician first.           YELLOW         ZONE Getting Worse    What it looks like:     Depression is starting to interfere with your life.     It may be hard to get out of bed; you may be starting to isolate yourself from others.    Symptoms of depression are starting to last most all day and this has happened for several days.     You may have suicidal thoughts but they are not constant.   What you need to do:     1. Call your care team, your response to treatment will improve if you keep your care team informed of your progress. Yellow periods are signs an adjustment may need to be made.     2. Continue your self-care, even if you have to fake it!    3. Talk to someone in your support network    4. Open up your depression survival kit           RED    ZONE Medical Alert - Get Help    What it looks like:     Depression is seriously interfering with your life.     You may experience these or other symptoms: You can t get out of bed most days, can t work or engage in other necessary activities, you have trouble taking care of basic hygiene, or basic responsibilities, thoughts of suicide or death that will not go away, self-injurious behavior.     What you need to do:  1. Call  your care team and request a same-day appointment. If they are not available (weekends or after hours) call your local crisis line, emergency room or 911.      Electronically signed by: Ronnell Lomax, January 17, 2017    Depression Self Care Plan / Survival Kit    Self-Care for Depression  Here s the deal. Your body and mind are really not as separate as most people think.  What you do and think affects how you feel and how you feel influences what you do and think. This means if you do things that people who feel good do, it will help you feel better.  Sometimes this is all it takes.  There is also a place for medication and therapy depending on how severe your depression is, so be sure to consult with your medical provider and/ or Behavioral Health Consultant if your symptoms are worsening or not improving.     In order to better manage my stress, I will:    Exercise  Get some form of exercise, every day. This will help reduce pain and release endorphins, the  feel good  chemicals in your brain. This is almost as good as taking antidepressants!  This is not the same as joining a gym and then never going! (they count on that by the way ) It can be as simple as just going for a walk or doing some gardening, anything that will get you moving.      Hygiene   Maintain good hygiene (Get out of bed in the morning, Make your bed, Brush your teeth, Take a shower, and Get dressed like you were going to work, even if you are unemployed).  If your clothes don't fit try to get ones that do.    Diet  I will strive to eat foods that are good for me, drink plenty of water, and avoid excessive sugar, caffeine, alcohol, and other mood-altering substances.  Some foods that are helpful in depression are: complex carbohydrates, B vitamins, flaxseed, fish or fish oil, fresh fruits and vegetables.    Psychotherapy  I agree to participate in Individual Therapy (if recommended).    Medication  If prescribed medications, I agree  to take them.  Missing doses can result in serious side effects.  I understand that drinking alcohol, or other illicit drug use, may cause potential side effects.  I will not stop my medication abruptly without first discussing it with my provider.    Staying Connected With Others  I will stay in touch with my friends, family members, and my primary care provider/team.    Use your imagination  Be creative.  We all have a creative side; it doesn t matter if it s oil painting, sand castles, or mud pies! This will also kick up the endorphins.    Witness Beauty  (AKA stop and smell the roses) Take a look outside, even in mid-winter. Notice colors, textures. Watch the squirrels and birds.     Service to others  Be of service to others.  There is always someone else in need.  By helping others we can  get out of ourselves  and remember the really important things.  This also provides opportunities for practicing all the other parts of the program.    Humor  Laugh and be silly!  Adjust your TV habits for less news and crime-drama and more comedy.    Control your stress  Try breathing deep, massage therapy, biofeedback, and meditation. Find time to relax each day.     My support system    Clinic Contact:  Phone number:    Contact 1:  Phone number:    Contact 2:  Phone number:    Mandaeism/:  Phone number:    Therapist:  Phone number:    Local crisis center:    Phone number:    Other community support:  Phone number:

## 2017-01-18 ASSESSMENT — PATIENT HEALTH QUESTIONNAIRE - PHQ9: SUM OF ALL RESPONSES TO PHQ QUESTIONS 1-9: 5

## 2017-01-31 ENCOUNTER — TRANSFERRED RECORDS (OUTPATIENT)
Dept: HEALTH INFORMATION MANAGEMENT | Facility: CLINIC | Age: 82
End: 2017-01-31

## 2017-10-23 NOTE — PATIENT INSTRUCTIONS
"*  Please make sure to use the Advair 1 inhalation twice per day, every day.    *  Use the Albuterol inhaler as needed, get used to using it before you leave your apartment for activities.     *  Continue all other medications at the same doses.  Contact your usual pharmacy if you need refills.     *  Pneumonia vaccine update given today.     *  Return to see me in 6 months, sooner if needed.  Call [phone] to schedule this appointment.     5 GOALS TO PREVENT VASCULAR DISEASE:     1.  Aggressive blood pressure control, under 130/80 ideally.  Using medications if needed.    Your blood pressure is under good control    BP Readings from Last 4 Encounters:   10/23/17 112/76   01/17/17 110/84   02/29/16 90/58   10/07/15 114/76       2.  Aggressive LDL cholesterol (\"bad cholesterol\") lowering as indicated.    Your goal is an LDL under 130 for sure, preferably under 100.  (If you have diabetes or previous vascular disease, the the LDL goals would be under 100 for sure, preferably under 70.)    New guidelines identify four high-risk groups who could benefit from statins:   *people with pre-existing heart disease, such as those who have had a heart attack;   *people ages 40 to 75 who have diabetes of any type  *patients ages 40 to 75 with at least a 7.5% risk of developing cardiovascular disease over the next decade, according to a formula described in the guidelines  *patients with the sort of super-high cholesterol that sometimes runs in families, as evidenced by an LDL of 190 milligrams per deciliter or higher    Your cholesterol levels are well controlled.    Recent Labs   Lab Test  01/17/17   0941  02/29/16   0946  02/24/15   1134  02/10/14   0852   CHOL  150  125  136  165   HDL  51  43*  47*  51   LDL  65  59  55  71   TRIG  168*  114  172*  216*   CHOLHDLRATIO   --    --   2.9  3.3       3.  Aggressive diabetic prevention, screening and/or management.      You do not have diabetes as of the most recent blood " "tests.     4.  No smoking    5.  Consider taking low dose aspirin (81 mg) tablet once per day over the age of 50, every day unless there is a specific reason that you cannot take aspirin (such as side effect, allergy, or you are on another \"blood thinner\").        --Based on your current cardiac risk factors, you should NOT take Aspirin because you are taking the Plavix (clopedigrel)    "

## 2017-10-23 NOTE — NURSING NOTE
"Chief Complaint   Patient presents with     Breathing Problem     increased SOB, fatigue X 2-3 weeks       Initial /76  Pulse 100  Temp 98.2  F (36.8  C) (Oral)  Wt 144 lb 12.8 oz (65.7 kg)  SpO2 (!) 81%  BMI 26.48 kg/m2 Estimated body mass index is 26.48 kg/(m^2) as calculated from the following:    Height as of 1/17/17: 5' 2\" (1.575 m).    Weight as of this encounter: 144 lb 12.8 oz (65.7 kg).  Medication Reconciliation: complete   Maya Miller CMA    '  "

## 2017-10-23 NOTE — MR AVS SNAPSHOT
"              After Visit Summary   10/23/2017    Maribeth Fowler    MRN: [id number]           Patient Information     Date Of Birth          8/26/1934        Visit Information        Provider Department      10/23/2017 7:40 AM Ronnell Lomax MD 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    *  Use the Albuterol inhaler as needed, get used to using it before you leave your apartment for activities.     *  Continue all other medications at the same doses.  Contact your usual pharmacy if you need refills.     *  Pneumonia vaccine update given today.     *  Return to see me in 6 months, sooner if needed.  Call [phone] to schedule this appointment.     5 GOALS TO PREVENT VASCULAR DISEASE:     1.  Aggressive blood pressure control, under 130/80 ideally.  Using medications if needed.    Your blood pressure is under good control    BP Readings from Last 4 Encounters:   10/23/17 112/76   01/17/17 110/84   02/29/16 90/58   10/07/15 114/76       2.  Aggressive LDL cholesterol (\"bad cholesterol\") lowering as indicated.    Your goal is an LDL under 130 for sure, preferably under 100.  (If you have diabetes or previous vascular disease, the the LDL goals would be under 100 for sure, preferably under 70.)    New guidelines identify four high-risk groups who could " "benefit from statins:   *people with pre-existing heart disease, such as those who have had a heart attack;   *people ages 40 to 75 who have diabetes of any type  *patients ages 40 to 75 with at least a 7.5% risk of developing cardiovascular disease over the next decade, according to a formula described in the guidelines  *patients with the sort of super-high cholesterol that sometimes runs in families, as evidenced by an LDL of 190 milligrams per deciliter or higher    Your cholesterol levels are well controlled.    Recent Labs   Lab Test  01/17/17   0941  02/29/16   0946  02/24/15   1134  02/10/14   0852   CHOL  150  125  136  165   HDL  51  43*  47*  51   LDL  65  59  55  71   TRIG  168*  114  172*  216*   CHOLHDLRATIO   --    --   2.9  3.3       3.  Aggressive diabetic prevention, screening and/or management.      You do not have diabetes as of the most recent blood tests.     4.  No smoking    5.  Consider taking low dose aspirin (81 mg) tablet once per day over the age of 50, every day unless there is a specific reason that you cannot take aspirin (such as side effect, allergy, or you are on another \"blood thinner\").        --Based on your current cardiac risk factors, you should NOT take Aspirin because you are taking the Plavix (clopedigrel)            Follow-ups after your visit        Who to contact     If you have questions or need follow up information about today's clinic visit or your schedule please contact [facility] directly at [phone].  Normal or non-critical lab and imaging results will be communicated to you by [employer], letter or phone within 4 business days after the clinic has received the results. If you do not hear from us within 7 days, please contact the clinic through [employer] or phone. If you have a critical or abnormal lab result, we will notify you by phone as soon as possible.  Submit refill requests through [employer] or call your pharmacy and they will " "forward the refill request to us. Please allow 3 business days for your refill to be completed.          Additional Information About Your Visit        [employer] Information     [employer] lets you send messages to your doctor, view your test results, renew your prescriptions, schedule appointments and more. To sign up, go to [email or web address] . Click on \"Log in\" on the left side of the screen, which will take you to the Welcome page. Then click on \"Sign up Now\" on the right side of the page.     You will be asked to enter the access code listed below, as well as some personal information. Please follow the directions to create your username and password.     Your access code is: [email or web address]  Expires: 2018  8:24 AM     Your access code will  in 90 days. If you need help or a new code, please call your [place] clinic or [phone].        Care EveryWhere ID     This is your Care EveryWhere ID. This could be used by other organizations to access your [place] medical records  [id number]        Your Vitals Were     Pulse Temperature Pulse Oximetry BMI (Body Mass Index)          100 98.2  F (36.8  C) (Oral) 91% 26.48 kg/m2         Blood Pressure from Last 3 Encounters:   10/23/17 112/76   17 110/84   16 90/58    Weight from Last 3 Encounters:   10/23/17 144 lb 12.8 oz (65.7 kg)   17 146 lb 1.6 oz (66.3 kg)   16 152 lb 14.4 oz (69.4 kg)              We Performed the Following     ADMIN MEDICARE: Pneumococcal Vaccine ()     CBC with platelets and differential     Comprehensive metabolic panel     Lipid panel reflex to direct LDL     Pneumococcal vaccine 23 valent PPSV23  (Pneumovax) [27990]          Where to get your medicines      These medications were sent to [id number] - [place], [place] - [address] [facility]  [address] [facility] [address]     Phone:  [phone]     albuterol 108 (90 BASE) MCG/ACT Inhaler    citalopram 20 MG tablet    " clopidogrel 75 MG tablet    fluticasone-salmeterol 250-50 MCG/DOSE diskus inhaler    metoprolol 25 MG 24 hr tablet    mirtazapine 15 MG tablet    simvastatin 40 MG tablet    tiotropium 18 MCG capsule    traZODone 100 MG tablet          Primary Care Provider Office Phone # Fax #    Ronnell Nathan Lomax [phone]       [address] [facility] [address]        Equal Access to Services     SOURAV MOLINA : Hadii aad ku hadasho Soomaali, waaxda luqadaha, qaybta kaalmada adeegyada, waxay idiin hayaan adeeg khmiltnosh lanikki . So [facility] [phone].    ATENCIÓN: Si heatherla esptracy, tiene a cunha disposición servicios gratuitos de asistencia lingüística. Rename al [phone].    We comply with applicable federal civil rights laws and [place] laws. We do not discriminate on the basis of race, color, national origin, age, disability, sex, sexual orientation, or gender identity.            Thank you!     Thank you for choosing [facility]  for your care. Our goal is always to provide you with excellent care. Hearing back from our patients is one way we can continue to improve our services. Please take a few minutes to complete the written survey that you may receive in the mail after your visit with us. Thank you!             Your Updated Medication List - Protect others around you: Learn how to safely use, store and throw away your medicines at www.disposemymeds.org.          This list is accurate as of: 10/23/17  8:24 AM.  Always use your most recent med list.                   Brand Name Dispense Instructions for use Diagnosis    albuterol 108 (90 BASE) MCG/ACT Inhaler    PROAIR HFA/PROVENTIL HFA/VENTOLIN HFA    1 Inhaler    Inhale 2 puffs into the lungs every 4 hours as needed    Panlobular emphysema (H)       * ASPIRIN NOT PRESCRIBED    INTENTIONAL    0 each    Antiplatelet medication not prescribed intentionally due to Plavix    Other generalized ischemic cerebrovascular disease        Bran Fiber 500 MG Tabs           citalopram 20 MG tablet    celeXA    90 tablet    Take 1 tablet (20 mg) by mouth daily    Major depressive disorder, recurrent episode, moderate (H)       clopidogrel 75 MG tablet    PLAVIX    90 tablet    Take 1 tablet (75 mg) by mouth daily    Cerebrovascular accident (CVA) due to stenosis of cerebral artery (H)       fluticasone-salmeterol 250-50 MCG/DOSE diskus inhaler    ADVAIR DISKUS    3 Inhaler    Inhale 1 puff into the lungs 2 times daily    Panlobular emphysema (H)       metoprolol 25 MG 24 hr tablet    TOPROL-XL    90 tablet    Take 1 tablet (25 mg) by mouth daily    Benign essential hypertension       mirtazapine 15 MG tablet    REMERON    90 tablet    Take 1 tablet (15 mg) by mouth At Bedtime    Vascular dementia without behavioral disturbance       Multi-vitamin Tabs tablet   Generic drug:  multivitamin, therapeutic with minerals      Take 1 tablet by mouth daily.        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    Panlobular emphysema (H)       traZODone 100 MG tablet    DESYREL    90 tablet    Take 1 tablet (100 mg) by mouth nightly as needed for sleep    Primary insomnia       vitamin D 2000 UNITS tablet      Take 2,000 Units by mouth daily    Vitamin D deficiency       * Notice:  This list has 2 medication(s) that are the same as other  medications prescribed for you. Read the directions carefully, and ask your doctor or other care provider to review them with you.

## 2017-10-23 NOTE — PROGRESS NOTES
"  SUBJECTIVE:   Maribeth Fowler is a 83 year old female who presents to clinic today for the following health issues:     Pt has not been using albuterol inhaler. Advair inhaler was  so she has not been using that as well    COPD Follow-Up   About one week ago, pt's daughter noticed that her oxygen was not putting out oxygen. Pt was probably with out O2 for a couple days, after getting it fixed, daughter turned up LMP from 1.5 to 2.5. Ever since pt was w/out O2, she has never really \"bounced back\" and has had breathing difficulty, SOB and faitgue.      Symptoms are currently: much worse    Current fatigue or dyspnea with ambulation: worsened from baseline.     Shortness of breath: much worse    Increased or change in Cough/Sputum: No    Fever(s): No    Baseline ambulation without stopping to rest:  1/2 blocks. Able to walk up 0 flights of stairs without stopping to rest.    Any ER/UC or hospital admissions since your last visit? No     History   Smoking Status     Former Smoker     Packs/day: 0.50     Years: 52.00     Types: Cigarettes     Quit date: 2013   Smokeless Tobacco     Never Used     No results found for: FEV1, GHZ3RHF      Amount of exercise or physical activity: None    Problems taking medications regularly: pt has not been using albuterol inhaler, adviar inhaler was     Medication side effects: none    Diet: regular (no restrictions)    2.     Blood presure remains well controlled at home  Readings outside clinic are within normal limits.  Reviewed last 6 BP readings in chart:  BP Readings from Last 6 Encounters:   10/23/17 112/76   17 110/84   16 90/58   10/07/15 114/76   02/24/15 128/88   14 118/70     He has not experienced any significant side effects from medicaiotns for hypertension.    NO active cardiac complaints or symptoms with exercise.     3.  Has history of hyperlipidemia.  On statin for this, denies any significant side effects of this medication.  "     Latest labs reviewed:    Recent Labs   Lab Test  01/17/17   0941  02/29/16   0946  02/24/15   1134  02/10/14   0852   CHOL  150  125  136  165   HDL  51  43*  47*  51   LDL  65  59  55  71   TRIG  168*  114  172*  216*   CHOLHDLRATIO   --    --   2.9  3.3        Lab Results   Component Value Date    AST 17 01/17/2017         4.    Depression:  Has known history of depression.  Has been on medication for this.  The patient does not report any significant side effects of this medication.  The prior symptoms leading to the original diagnosis and decision to start medication therapy are better.     Appetite is stable.  Sleeping patterns are stable.    No reported thoughts of suicide or homicide.    PHQ-9 SCORE 10/7/2015 1/17/2017 10/23/2017   Total Score - - -   Total Score MyChart - - 6 (Mild depression)   Total Score 6 5 6        5.  Has known memory problems due to Alzheimers vs multi-infarct dementia.   Has not been on specific medications for this.   Family reports no acute changes, but still has persistent short term memory loss and impaired concentration.   They feel that she is currently not a danger to themselves and that they are in a safe situation.   The patient no longer drives.     Six Item Cognitive Impairment Text (6CIT)    Six Item Cognitive Impairment Test   (6CIT):      What year is it?                               Correct - 0 points    What month is it?                               Correct - 0 points      Give the patient an address to remember with five components:   Alber Lomax ( first and last name - 2 components)   [address]  (number and name of street - 2 components)   [place] ( city - 1 component)      About what time is it (within the hour)? Incorrect - 3 points    Count backwards from 20 to 1:   One error - 2 points    Say the months of the year in reverse: One error - 2 points    Repeat the address phrase:   1 error - 2 points    Total 6CIT Score:      9/28    Interpretation:  The 6CIT uses an inverse score and questions are weighted to produce a total out of 28. Scores of 0-7 are considered normal and 8 or more significant.    Advantages The test has high sensitivity without compromising specificity even in mild dementia. It is easy to translate linguistically and culturally.  Disadvantages The main disadvantage is in the scoring and weighting of the test, which is initially confusing, however computer models have simplified this greatly.    Probability Statistics: At the 7/8 cut off: Overall figures sensitivity 90% specificity 100%, in mild dementia sensitivity = 78% , specificity = 100%    Copyright 2000 [facility], [facility]. Courtesy of Dr. Anton Campbell        Problem list and histories reviewed & adjusted, as indicated.  Additional history: as documented        Reviewed and updated as needed this visit by clinical staff  Tobacco  Allergies       Reviewed and updated as needed this visit by Provider           Past Medical History:  ---------------------------  Past Medical History:   Diagnosis Date     Arthritis      Asbestosis(501)      Calculus of kidney      Chronic airway obstruction, not elsewhere classified      Chronic airway obstruction, not elsewhere classified      Depressive disorder, not elsewhere classified      Esophageal reflux      Essential hypertension, benign      Gastro-oesophageal reflux disease      Other and unspecified hyperlipidemia 10/04     prior to treatment     Other convulsions 2004    seizure since stroke     Other generalized ischemic cerebrovascular disease 2004    acute right thalamic infarct per MRI     Stroke (H) 2004    acute right thalamic infarct per MRI     Tobacco use disorder      Unspecified arthropathy, pelvic region and thigh      Unspecified cerebral artery occlusion with cerebral infarction      Vascular dementia     per Neurology evaluation     Vitamin D deficiency 9/12/12    vitamin D 25       Past  Surgical History:  ---------------------------  Past Surgical History:   Procedure Laterality Date     ARTHROPLASTY HIP  4/23/2012    Procedure:ARTHROPLASTY HIP; RIGHT TOTAL HIP ARTHROPLASTY ; Surgeon:YONIS DUFFY; Location:[facility] OR     BACK SURGERY      cervical spine repair     C NONSPECIFIC PROCEDURE      T&A     C NONSPECIFIC PROCEDURE      urethral stone removal     C NONSPECIFIC PROCEDURE  6/99    Left hip JESUS     HYSTERECTOMY, CERVIX STATUS UNKNOWN      partial hysterectomy (secondary to fibroids)     HYSTERECTOMY, PAP NO LONGER INDICATED         Current Medications:  ---------------------------  Current Outpatient Prescriptions   Medication Sig Dispense Refill     simvastatin (ZOCOR) 40 MG tablet Take 1 tablet (40 mg) by mouth At Bedtime 90 tablet 3     mirtazapine (REMERON) 15 MG tablet Take 1 tablet (15 mg) by mouth At Bedtime 90 tablet 3     metoprolol (TOPROL-XL) 25 MG 24 hr tablet Take 1 tablet (25 mg) by mouth daily 90 tablet 3     citalopram (CELEXA) 20 MG tablet Take 1 tablet (20 mg) by mouth daily 90 tablet 3     traZODone (DESYREL) 100 MG tablet Take 1 tablet (100 mg) by mouth nightly as needed for sleep 90 tablet 1     tiotropium (SPIRIVA HANDIHALER) 18 MCG capsule Inhale 1 capsule (18 mcg) into the lungs daily Inhale contents of one capsule daily. 90 capsule 3     clopidogrel (PLAVIX) 75 MG tablet Take 1 tablet (75 mg) by mouth daily 90 tablet 3     Bran Fiber 500 MG TABS        Omeprazole (PRILOSEC PO) Take 20 mg by mouth       ORDER FOR DME Equipment being ordered: Oxygen 1 each 0     Cholecalciferol (VITAMIN D) 2000 UNITS tablet Take 2,000 Units by mouth daily       ASPIRIN NOT PRESCRIBED, INTENTIONAL, Antiplatelet medication not prescribed intentionally due to Plavix 0 each      Multiple Vitamin (MULTI-VITAMIN) per tablet Take 1 tablet by mouth daily.       ORDER FOR DME Wheeled walker 1 each 0     fluticasone-salmeterol (ADVAIR DISKUS) 250-50 MCG/DOSE diskus inhaler Inhale 1 puff  into the lungs 2 times daily (Patient not taking: Reported on 10/23/2017) 3 Inhaler 3     albuterol (PROAIR HFA/PROVENTIL HFA/VENTOLIN HFA) 108 (90 BASE) MCG/ACT Inhaler Inhale 2 puffs into the lungs every 4 hours as needed (Patient not taking: Reported on 10/23/2017) 1 Inhaler 11       Allergies:  -------------  Allergies   Allergen Reactions     No Known Allergies        Social History:  -------------------  Social History     Social History     Marital status: Single     Spouse name: N/A     Number of children: N/A     Years of education: N/A     Occupational History     Not on file.     Social History Main Topics     Smoking status: Former Smoker     Packs/day: 0.50     Years: 52.00     Types: Cigarettes     Quit date: 9/1/2013     Smokeless tobacco: Never Used     Alcohol use 0.0 oz/week     0 Standard drinks or equivalent per week      Comment: rare-- glass of wine     Drug use: No     Sexual activity: Not Currently     Other Topics Concern     Not on file     Social History Narrative       Family Medical History:  ------------------------------  Family History   Problem Relation Age of Onset     CANCER Mother      D:79  lung ca     CANCER Father      D:  72  bone ca     DIABETES Brother      type 2     Cardiovascular Brother      DIABETES Brother      type 2     CEREBROVASCULAR DISEASE Brother          ROS:  REVIEW OF SYSTEMS:    RESP: positive for cough, dyspnea, wheezing; negative for hemoptysis   CV: negative for chest pain, palpitations, PND, orthopnea  GI: negative for dysphagia, N/V, pain, melena, diarrhea and constipation  NEURO: negative for numbness/tingling, paralysis, incoordination, or focal weakness     OBJECTIVE:                                                    /76  Pulse 100  Temp 98.2  F (36.8  C) (Oral)  Wt 144 lb 12.8 oz (65.7 kg)  SpO2 91%  BMI 26.48 kg/m2     GENERAL alert and no distress  EYES:  Normal sclera,conjunctiva, EOMI  HENT: oral and posterior pharynx without lesions  or erythema, facies symmetric  NECK: Neck supple. No LAD, without thyroidmegaly or JVD., Carotids without bruits.  RESP: Clear to ausculation bilaterally without wheezes or crackles. Normal BS in all fields.  CV: RRR normal S1S2 without murmurs, rubs or gallops. PMI normal  LYMPH: no cervical lymph adenopathy appreciated  MS: extremities- no gross deformities of the visible extremities noted, no edema  PSYCH: Alert and oriented times 3; speech- coherent  SKIN:  No obvious significant skin lesions on visible portions of face          ASSESSMENT/PLAN:                                                      (F33.1) Major depressive disorder, recurrent episode, moderate (H)  (primary encounter diagnosis)  Comment: This condition is currently controlled on the current medical regimen.  Continue current therapy.   She has not experienced any significant side effects of this medication.   Plan: citalopram (CELEXA) 20 MG tablet            (J43.1) Panlobular emphysema (H)  Comment: This condition is currently controlled on the current medical regimen.  Continue current therapy.   She needs to use the Advair and spiriva every day.   Discussed prices with them.   Use albuterol as needed.   Should not use more than 2 lpm for nasal cannula.   Discussed general issues of COPD, including pathophysiology, ways it will affect the pt., when to seek help, reviewed the typical medications (how they are taken, how they help)   Plan: fluticasone-salmeterol (ADVAIR DISKUS) 250-50         MCG/DOSE diskus inhaler, tiotropium (SPIRIVA         HANDIHALER) 18 MCG capsule, albuterol (PROAIR         HFA/PROVENTIL HFA/VENTOLIN HFA) 108 (90 BASE)         MCG/ACT Inhaler            (E78.5) Hyperlipidemia LDL goal <100  Comment: Discussed new guidelines recommending a statin cholesterol lowering medication for any patient with either diabetes and/or vascular disease, aiming for a LDL goal under 100 for sure, ideally under 70.    Reviewed statins and  their side effects including muscle pain, muscle inflammation, GI upset.  Told the patient to stop the medication in question and to call if any side effects develop.   Recommended CoQ10 200-300 mg at the same time as taking the statin medication to help reduce the chance of muscle side effects from the statin.    Plan: Comprehensive metabolic panel, Lipid panel         reflex to direct LDL, simvastatin (ZOCOR) 40 MG        tablet            (I63.9) Cerebrovascular accident (CVA), unspecified mechanism (H)  Comment: Discussed secondary risk factor modification and recommended continuing aggressive management of these items.     No new sx.   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   Nothing new.   cancer   Plan:     (J61) Asbestosis (H)  Comment: no changes.   Plan:     (I10) Benign essential hypertension  Comment: This condition is currently controlled on the current medical regimen.  Continue current therapy.   Plan: CBC with platelets and differential,         Comprehensive metabolic panel, metoprolol         (TOPROL-XL) 25 MG 24 hr tablet            (I63.50) Cerebrovascular accident (CVA) due to stenosis of cerebral artery (H)  Comment: Discussed secondary risk factor modification and recommended continuing aggressive management of these items.   Plan: simvastatin (ZOCOR) 40 MG tablet, clopidogrel         (PLAVIX) 75 MG tablet            (F01.50) Vascular dementia without behavioral disturbance  Comment: slight decline in energy and memory per daughter.   Continue same meds.   Discussed eventual course of continued small decline in cognitive function.    Recommended that they consdider adding services as she needs.   Plan: mirtazapine (REMERON) 15 MG tablet            (F51.01) Primary insomnia  Comment:   Plan: traZODone  (DESYREL) 100 MG tablet            (Z23) Need for prophylactic vaccination against Streptococcus pneumoniae (pneumococcus)  Comment:   Plan: Pneumococcal vaccine 23 valent PPSV23          (Pneumovax) [43399], ADMIN MEDICARE:         Pneumococcal Vaccine ()               See Patient Instructions    LEON BUSTAMANTE M.D., MD  [facility]       Answers for HPI/ROS submitted by the patient on 10/23/2017   PHQ9 TOTAL SCORE: 6      Spent greater than 45 minutes with pt, greater than 50% of time was educational and counseling.

## 2017-10-23 NOTE — LETTER
[facility]  [address]  [address]  [phone]        Maribeth Fowler  [address] [facility] [address]      10/24/2017        Dear Ms. Maribeth Fowler:    I am writing to inform you of the results of the laboratory tests you had done recently.    Total Cholesterol:   Lab Results   Component Value Date    CHOL 130 10/23/2017          (Recommended: below 200)        HDL (good) Cholesterol :   Lab Results   Component Value Date    HDL 52 10/23/2017         (Recommended: 40 or more)  LDL (bad) Cholesterol:    Lab Results   Component Value Date    LDL 56 10/23/2017          (Recommended: below 130, below 100 if heart disease or diabetes is diagnosed)   Triglycerides:    Lab Results   Component Value Date    TRIG 111 10/23/2017       (Recommended: below 180)  Total cholesterol/HDL (Cholesterol ratio:   Lab Results   Component Value Date    CHOLHDLRATIO 2.9 02/24/2015    NHDL 78 10/23/2017     (Recommended: below 5.0)    Slight variations and daily fluctuations are normal and should cause little concern. Overall patterns and values are most significant.  An elevated cholesterol is one factor in increasing your risk of heart and vascular disease.    Additional results of your recent labs are as noted.   Liver function: NORMAL  Kidney  function: NORMAL  Hemoglobin: NORMAL  Electrolytes: NORMAL  Glucose: NORMAL    Your labs look good, Continue all medications at the same doses.  Contact your usual pharmacy if you need refills.     Thank you for allowing me to participate in your care. If you have any further questions or problems, please contact me via our nurse line at [phone].    Sincerely,          Ronnell Lomax MD  Department of Internal Medicine  [facility]

## 2018-01-01 ENCOUNTER — TELEPHONE (OUTPATIENT)
Dept: INTERNAL MEDICINE | Facility: CLINIC | Age: 83
End: 2018-01-01

## 2018-01-01 ENCOUNTER — RECORDS - HEALTHEAST (OUTPATIENT)
Dept: LAB | Facility: CLINIC | Age: 83
End: 2018-01-01

## 2018-01-01 ENCOUNTER — RADIANT APPOINTMENT (OUTPATIENT)
Dept: GENERAL RADIOLOGY | Facility: CLINIC | Age: 83
End: 2018-01-01
Attending: INTERNAL MEDICINE
Payer: COMMERCIAL

## 2018-01-01 ENCOUNTER — MEDICAL CORRESPONDENCE (OUTPATIENT)
Dept: HEALTH INFORMATION MANAGEMENT | Facility: CLINIC | Age: 83
End: 2018-01-01

## 2018-01-01 ENCOUNTER — OFFICE VISIT (OUTPATIENT)
Dept: INTERNAL MEDICINE | Facility: CLINIC | Age: 83
End: 2018-01-01
Payer: COMMERCIAL

## 2018-01-01 VITALS
TEMPERATURE: 98.1 F | HEART RATE: 75 BPM | OXYGEN SATURATION: 92 % | BODY MASS INDEX: 26.32 KG/M2 | SYSTOLIC BLOOD PRESSURE: 116 MMHG | DIASTOLIC BLOOD PRESSURE: 74 MMHG | WEIGHT: 143.9 LBS

## 2018-01-01 VITALS
BODY MASS INDEX: 25.41 KG/M2 | RESPIRATION RATE: 22 BRPM | OXYGEN SATURATION: 88 % | SYSTOLIC BLOOD PRESSURE: 110 MMHG | HEART RATE: 98 BPM | TEMPERATURE: 97.6 F | DIASTOLIC BLOOD PRESSURE: 66 MMHG | WEIGHT: 138.9 LBS

## 2018-01-01 DIAGNOSIS — M19.91 PRIMARY OSTEOARTHRITIS, UNSPECIFIED SITE: ICD-10-CM

## 2018-01-01 DIAGNOSIS — F51.01 PRIMARY INSOMNIA: ICD-10-CM

## 2018-01-01 DIAGNOSIS — K21.9 GASTROESOPHAGEAL REFLUX DISEASE WITHOUT ESOPHAGITIS: Primary | ICD-10-CM

## 2018-01-01 DIAGNOSIS — G89.29 CHRONIC LEFT SHOULDER PAIN: ICD-10-CM

## 2018-01-01 DIAGNOSIS — R06.09 DOE (DYSPNEA ON EXERTION): ICD-10-CM

## 2018-01-01 DIAGNOSIS — R09.1 PLEURISY: Primary | ICD-10-CM

## 2018-01-01 DIAGNOSIS — I63.50 CEREBROVASCULAR ACCIDENT (CVA) DUE TO STENOSIS OF CEREBRAL ARTERY (H): ICD-10-CM

## 2018-01-01 DIAGNOSIS — J61 ASBESTOSIS (H): ICD-10-CM

## 2018-01-01 DIAGNOSIS — J43.1 PANLOBULAR EMPHYSEMA (H): Primary | ICD-10-CM

## 2018-01-01 DIAGNOSIS — E78.5 HYPERLIPIDEMIA LDL GOAL <100: ICD-10-CM

## 2018-01-01 DIAGNOSIS — M25.512 CHRONIC LEFT SHOULDER PAIN: ICD-10-CM

## 2018-01-01 DIAGNOSIS — J43.1 PANLOBULAR EMPHYSEMA (H): ICD-10-CM

## 2018-01-01 DIAGNOSIS — I10 BENIGN ESSENTIAL HYPERTENSION: ICD-10-CM

## 2018-01-01 DIAGNOSIS — F01.50 VASCULAR DEMENTIA WITHOUT BEHAVIORAL DISTURBANCE (H): ICD-10-CM

## 2018-01-01 DIAGNOSIS — F33.1 MAJOR DEPRESSIVE DISORDER, RECURRENT EPISODE, MODERATE (H): ICD-10-CM

## 2018-01-01 DIAGNOSIS — E55.9 VITAMIN D DEFICIENCY: Primary | ICD-10-CM

## 2018-01-01 DIAGNOSIS — K59.00 CONSTIPATION, UNSPECIFIED CONSTIPATION TYPE: Primary | ICD-10-CM

## 2018-01-01 LAB
25(OH)D3 SERPL-MCNC: 39.1 NG/ML (ref 30–80)
ALBUMIN SERPL-MCNC: 3.6 G/DL (ref 3.4–5)
ALP SERPL-CCNC: 89 U/L (ref 40–150)
ALT SERPL W P-5'-P-CCNC: 17 U/L (ref 0–50)
ANION GAP SERPL CALCULATED.3IONS-SCNC: 12 MMOL/L (ref 5–18)
ANION GAP SERPL CALCULATED.3IONS-SCNC: 7 MMOL/L (ref 3–14)
AST SERPL W P-5'-P-CCNC: 20 U/L (ref 0–45)
BASOPHILS # BLD AUTO: 0 THOU/UL (ref 0–0.2)
BASOPHILS # BLD AUTO: 0.1 THOU/UL (ref 0–0.2)
BASOPHILS NFR BLD AUTO: 0 % (ref 0–2)
BASOPHILS NFR BLD AUTO: 0 % (ref 0–2)
BILIRUB SERPL-MCNC: 0.3 MG/DL (ref 0.2–1.3)
BUN SERPL-MCNC: 14 MG/DL (ref 7–30)
BUN SERPL-MCNC: 24 MG/DL (ref 8–28)
CALCIUM SERPL-MCNC: 8.8 MG/DL (ref 8.5–10.1)
CALCIUM SERPL-MCNC: 9 MG/DL (ref 8.5–10.5)
CHLORIDE BLD-SCNC: 107 MMOL/L (ref 98–107)
CHLORIDE SERPL-SCNC: 106 MMOL/L (ref 94–109)
CO2 SERPL-SCNC: 25 MMOL/L (ref 22–31)
CO2 SERPL-SCNC: 28 MMOL/L (ref 20–32)
CREAT SERPL-MCNC: 0.7 MG/DL (ref 0.6–1.1)
CREAT SERPL-MCNC: 0.75 MG/DL (ref 0.52–1.04)
EOSINOPHIL # BLD AUTO: 0.1 THOU/UL (ref 0–0.4)
EOSINOPHIL # BLD AUTO: 0.3 THOU/UL (ref 0–0.4)
EOSINOPHIL NFR BLD AUTO: 1 % (ref 0–6)
EOSINOPHIL NFR BLD AUTO: 2 % (ref 0–6)
ERYTHROCYTE [DISTWIDTH] IN BLOOD BY AUTOMATED COUNT: 16.3 % (ref 10–15)
ERYTHROCYTE [DISTWIDTH] IN BLOOD BY AUTOMATED COUNT: 16.6 % (ref 11–14.5)
ERYTHROCYTE [DISTWIDTH] IN BLOOD BY AUTOMATED COUNT: 18 % (ref 11–14.5)
GFR SERPL CREATININE-BSD FRML MDRD: 73 ML/MIN/1.7M2
GFR SERPL CREATININE-BSD FRML MDRD: >60 ML/MIN/1.73M2
GLUCOSE BLD-MCNC: 81 MG/DL (ref 70–125)
GLUCOSE SERPL-MCNC: 88 MG/DL (ref 70–99)
HCT VFR BLD AUTO: 41.7 % (ref 35–47)
HCT VFR BLD AUTO: 43 % (ref 35–47)
HCT VFR BLD AUTO: 45.6 % (ref 35–47)
HGB BLD-MCNC: 12.4 G/DL (ref 12–16)
HGB BLD-MCNC: 13.1 G/DL (ref 12–16)
HGB BLD-MCNC: 14.5 G/DL (ref 11.7–15.7)
LYMPHOCYTES # BLD AUTO: 0.7 THOU/UL (ref 0.8–4.4)
LYMPHOCYTES # BLD AUTO: 2.9 THOU/UL (ref 0.8–4.4)
LYMPHOCYTES NFR BLD AUTO: 20 % (ref 20–40)
LYMPHOCYTES NFR BLD AUTO: 6 % (ref 20–40)
MCH RBC QN AUTO: 29.5 PG (ref 26.5–33)
MCH RBC QN AUTO: 30.3 PG (ref 27–34)
MCH RBC QN AUTO: 30.3 PG (ref 27–34)
MCHC RBC AUTO-ENTMCNC: 29.7 G/DL (ref 32–36)
MCHC RBC AUTO-ENTMCNC: 30.5 G/DL (ref 32–36)
MCHC RBC AUTO-ENTMCNC: 31.8 G/DL (ref 31.5–36.5)
MCV RBC AUTO: 102 FL (ref 80–100)
MCV RBC AUTO: 93 FL (ref 78–100)
MCV RBC AUTO: 99 FL (ref 80–100)
MONOCYTES # BLD AUTO: 0.4 THOU/UL (ref 0–0.9)
MONOCYTES # BLD AUTO: 0.8 THOU/UL (ref 0–0.9)
MONOCYTES NFR BLD AUTO: 3 % (ref 2–10)
MONOCYTES NFR BLD AUTO: 6 % (ref 2–10)
NEUTROPHILS # BLD AUTO: 10.8 THOU/UL (ref 2–7.7)
NEUTROPHILS # BLD AUTO: 11.6 THOU/UL (ref 2–7.7)
NEUTROPHILS NFR BLD AUTO: 73 % (ref 50–70)
NEUTROPHILS NFR BLD AUTO: 91 % (ref 50–70)
PLATELET # BLD AUTO: 220 THOU/UL (ref 140–440)
PLATELET # BLD AUTO: 262 THOU/UL (ref 140–440)
PLATELET # BLD AUTO: 273 10E9/L (ref 150–450)
PMV BLD AUTO: 10.2 FL (ref 8.5–12.5)
PMV BLD AUTO: 9.7 FL (ref 8.5–12.5)
POTASSIUM BLD-SCNC: 3.4 MMOL/L (ref 3.5–5)
POTASSIUM SERPL-SCNC: 3.9 MMOL/L (ref 3.4–5.3)
PROT SERPL-MCNC: 7.8 G/DL (ref 6.8–8.8)
RBC # BLD AUTO: 4.09 MILL/UL (ref 3.8–5.4)
RBC # BLD AUTO: 4.33 MILL/UL (ref 3.8–5.4)
RBC # BLD AUTO: 4.92 10E12/L (ref 3.8–5.2)
SODIUM SERPL-SCNC: 141 MMOL/L (ref 133–144)
SODIUM SERPL-SCNC: 144 MMOL/L (ref 136–145)
T4 FREE SERPL-MCNC: 1.1 NG/DL (ref 0.7–1.8)
TSH SERPL DL<=0.005 MIU/L-ACNC: 1.42 UIU/ML (ref 0.3–5)
TSH SERPL DL<=0.005 MIU/L-ACNC: 6.08 UIU/ML (ref 0.3–5)
VIT B12 SERPL-MCNC: 461 PG/ML (ref 213–816)
WBC # BLD AUTO: 11.2 10E9/L (ref 4–11)
WBC: 12.9 THOU/UL (ref 4–11)
WBC: 15 THOU/UL (ref 4–11)

## 2018-01-01 PROCEDURE — 93000 ELECTROCARDIOGRAM COMPLETE: CPT | Performed by: INTERNAL MEDICINE

## 2018-01-01 PROCEDURE — 80053 COMPREHEN METABOLIC PANEL: CPT | Performed by: INTERNAL MEDICINE

## 2018-01-01 PROCEDURE — 85027 COMPLETE CBC AUTOMATED: CPT | Performed by: INTERNAL MEDICINE

## 2018-01-01 PROCEDURE — 36415 COLL VENOUS BLD VENIPUNCTURE: CPT | Performed by: INTERNAL MEDICINE

## 2018-01-01 PROCEDURE — 99214 OFFICE O/P EST MOD 30 MIN: CPT | Performed by: INTERNAL MEDICINE

## 2018-01-01 PROCEDURE — 73030 X-RAY EXAM OF SHOULDER: CPT | Mod: LT

## 2018-01-01 PROCEDURE — 99215 OFFICE O/P EST HI 40 MIN: CPT | Performed by: INTERNAL MEDICINE

## 2018-01-01 PROCEDURE — 71046 X-RAY EXAM CHEST 2 VIEWS: CPT | Mod: FY

## 2018-01-01 RX ORDER — TRAZODONE HYDROCHLORIDE 100 MG/1
TABLET ORAL
Qty: 90 TABLET | Refills: 0 | Status: SHIPPED | OUTPATIENT
Start: 2018-01-01

## 2018-01-01 RX ORDER — ALBUTEROL SULFATE 0.83 MG/ML
1 SOLUTION RESPIRATORY (INHALATION) EVERY 6 HOURS PRN
Qty: 2 BOX | Refills: 11 | Status: SHIPPED | OUTPATIENT
Start: 2018-01-01 | End: 2018-01-01

## 2018-01-01 RX ORDER — ALBUTEROL SULFATE 0.83 MG/ML
1 SOLUTION RESPIRATORY (INHALATION) EVERY 6 HOURS PRN
Qty: 1 BOX | Refills: 5 | Status: SHIPPED | OUTPATIENT
Start: 2018-01-01 | End: 2018-01-01

## 2018-01-01 RX ORDER — PREDNISONE 10 MG/1
10 TABLET ORAL DAILY
Qty: 30 TABLET | Refills: 5 | Status: SHIPPED | OUTPATIENT
Start: 2018-01-01 | End: 2018-01-01

## 2018-01-01 RX ORDER — ALBUTEROL SULFATE 0.83 MG/ML
1 SOLUTION RESPIRATORY (INHALATION) EVERY 6 HOURS PRN
Qty: 2 BOX | Refills: 11 | Status: SHIPPED | OUTPATIENT
Start: 2018-01-01

## 2018-01-01 RX ORDER — CITALOPRAM HYDROBROMIDE 20 MG/1
20 TABLET ORAL DAILY
Qty: 90 TABLET | Refills: 1 | Status: SHIPPED | OUTPATIENT
Start: 2018-01-01

## 2018-01-01 RX ORDER — MULTIVITAMIN WITH FOLIC ACID 400 MCG
TABLET ORAL
Qty: 31 TABLET | Refills: 11 | Status: SHIPPED | OUTPATIENT
Start: 2018-01-01

## 2018-01-01 RX ORDER — SIMVASTATIN 40 MG
TABLET ORAL
Qty: 30 TABLET | Refills: 5 | Status: SHIPPED | OUTPATIENT
Start: 2018-01-01

## 2018-01-01 RX ORDER — PREDNISONE 10 MG/1
10 TABLET ORAL DAILY
Qty: 30 TABLET | Refills: 5 | Status: SHIPPED | OUTPATIENT
Start: 2018-01-01

## 2018-01-01 RX ORDER — MIRTAZAPINE 15 MG/1
TABLET, FILM COATED ORAL
Qty: 30 TABLET | Refills: 10 | Status: SHIPPED | OUTPATIENT
Start: 2018-01-01

## 2018-01-01 RX ORDER — CHOLECALCIFEROL (VITAMIN D3) 25 MCG
TABLET ORAL
Qty: 60 TABLET | Refills: 10 | OUTPATIENT
Start: 2018-01-01

## 2018-01-01 RX ORDER — CLOPIDOGREL BISULFATE 75 MG/1
75 TABLET ORAL DAILY
Qty: 90 TABLET | Refills: 1 | Status: SHIPPED | OUTPATIENT
Start: 2018-01-01

## 2018-01-01 RX ORDER — CHOLECALCIFEROL (VITAMIN D3) 50 MCG
2000 TABLET ORAL DAILY
Qty: 31 TABLET | Refills: 11 | Status: SHIPPED | OUTPATIENT
Start: 2018-01-01

## 2018-01-01 RX ORDER — METOPROLOL SUCCINATE 25 MG/1
25 TABLET, EXTENDED RELEASE ORAL DAILY
Qty: 90 TABLET | Refills: 1 | Status: SHIPPED | OUTPATIENT
Start: 2018-01-01

## 2018-01-01 RX ORDER — ACETAMINOPHEN 500 MG
1000 TABLET ORAL 2 TIMES DAILY
Qty: 120 TABLET | Refills: 11 | Status: SHIPPED | OUTPATIENT
Start: 2018-01-01 | End: 2018-01-01

## 2018-01-01 RX ORDER — ALBUTEROL SULFATE 90 UG/1
2 AEROSOL, METERED RESPIRATORY (INHALATION) 4 TIMES DAILY PRN
Qty: 1 INHALER | Refills: 11 | Status: SHIPPED | OUTPATIENT
Start: 2018-01-01

## 2018-01-01 RX ORDER — ALBUTEROL SULFATE 90 UG/1
2 AEROSOL, METERED RESPIRATORY (INHALATION) EVERY 4 HOURS PRN
Qty: 1 INHALER | Refills: 11 | Status: SHIPPED | OUTPATIENT
Start: 2018-01-01 | End: 2018-01-01

## 2018-01-01 RX ORDER — PREDNISONE 10 MG/1
TABLET ORAL
Qty: 75 TABLET | Refills: 0 | Status: SHIPPED | OUTPATIENT
Start: 2018-01-01 | End: 2018-01-01

## 2018-01-01 ASSESSMENT — PAIN SCALES - GENERAL: PAINLEVEL: EXTREME PAIN (8)

## 2018-03-01 NOTE — PATIENT INSTRUCTIONS
*  Pleuritic chest pain from the asbestosis.     *  Labs and xray all look stable.     *  Prednisone:   40 mg per day for one week, then 30 mg once per day for one week, then 20 mg once per day for one week, then 10 mg per da for one week, then 5 mg (1/2 of 10 mg) once per day for one week, then stop.      *  Continue all medications at the same doses.  Contact your usual pharmacy if you need refills.     *  Return to see me in April, sooner if needed.  Call [phone] to schedule this appointment.

## 2018-03-01 NOTE — PROGRESS NOTES
SUBJECTIVE:   Maribeth Fowler is a 83 year old female who presents to clinic today for the following health issues:      Musculoskeletal problem/pain      Duration: 3 months    Description  Location: L shoulder    Intensity:  8/10    Accompanying signs and symptoms: radiation of pain to to R shoulder.    Discomfort radiates from upper chets about the left shoulder., unable to find comforable position.     Pain is constant, every minute of every day for the past 2-3 months, getting progressively worse.     Pain is constant dull ache, all the time, all day, every day.     No relation to activity at all.     particularly worse in the cold air according to the patient.     No fevers, no chills    Non-producitve coughing from her COPD remains unchanged.         History  Previous similar problem: YES  Previous evaluation:  none    Precipitating or alleviating factors:  Trauma or overuse: no   Aggravating factors include: cold or damp weather, activity like walking with her walker    Therapies tried and outcome: acetaminophen with out any relief, warm water in shower with little relief      COPD Follow-Up    Symptoms are currently:  Worsened, having more shortness of breath and dyspnea on exertion.     Using her advair and spiriva every day    On chronci O2 at 2-3 lpm    Current fatigue or dyspnea with ambulation: worsened from baseline    Shortness of breath:  worsened    Increased or change in Cough/Sputum: No    Fever(s): No    Baseline ambulation without stopping to rest:  20-25 feet. Able to walk up 0 flights of stairs without stopping to rest.    Any ER/UC or hospital admissions since your last visit? No     History   Smoking Status     Former Smoker     Packs/day: 0.50     Years: 52.00     Types: Cigarettes     Quit date: 9/1/2013   Smokeless Tobacco     Never Used     No results found for: FEV1, [id number]    Problem list and histories reviewed & adjusted, as indicated.  Additional history: as  documented        Reviewed and updated as needed this visit by clinical staff       Reviewed and updated as needed this visit by Provider           Past Medical History:  ---------------------------  Past Medical History:   Diagnosis Date     Arthritis      Asbestosis(501)      Calculus of kidney      Chronic airway obstruction, not elsewhere classified      Chronic airway obstruction, not elsewhere classified      Depressive disorder, not elsewhere classified      Esophageal reflux      Essential hypertension, benign      Gastro-oesophageal reflux disease      Other and unspecified hyperlipidemia 10/04     prior to treatment     Other convulsions 2004    seizure since stroke     Other generalized ischemic cerebrovascular disease 2004    acute right thalamic infarct per MRI     Stroke (H) 2004    acute right thalamic infarct per MRI     Tobacco use disorder      Unspecified arthropathy, pelvic region and thigh      Unspecified cerebral artery occlusion with cerebral infarction      Vascular dementia     per Neurology evaluation     Vitamin D deficiency 9/12/12    vitamin D 25       Past Surgical History:  ---------------------------  Past Surgical History:   Procedure Laterality Date     ARTHROPLASTY HIP  4/23/2012    Procedure:ARTHROPLASTY HIP; RIGHT TOTAL HIP ARTHROPLASTY ; Surgeon:YONIS DUFFY; Location: OR     BACK SURGERY      cervical spine repair     C NONSPECIFIC PROCEDURE      T&A     C NONSPECIFIC PROCEDURE      urethral stone removal     C NONSPECIFIC PROCEDURE  6/99    Left hip JESUS     HYSTERECTOMY, CERVIX STATUS UNKNOWN      partial hysterectomy (secondary to fibroids)     HYSTERECTOMY, PAP NO LONGER INDICATED         Current Medications:  ---------------------------  Current Outpatient Prescriptions   Medication Sig Dispense Refill     simvastatin (ZOCOR) 40 MG tablet Take 1 tablet (40 mg) by mouth At Bedtime 90 tablet 3     mirtazapine (REMERON) 15 MG tablet Take 1 tablet (15 mg)  by mouth At Bedtime 90 tablet 3     metoprolol (TOPROL-XL) 25 MG 24 hr tablet Take 1 tablet (25 mg) by mouth daily 90 tablet 1     citalopram (CELEXA) 20 MG tablet Take 1 tablet (20 mg) by mouth daily 90 tablet 1     traZODone (DESYREL) 100 MG tablet Take 1 tablet (100 mg) by mouth nightly as needed for sleep 90 tablet 1     fluticasone-salmeterol (ADVAIR DISKUS) 250-50 MCG/DOSE diskus inhaler Inhale 1 puff into the lungs 2 times daily 3 Inhaler 3     tiotropium (SPIRIVA HANDIHALER) 18 MCG capsule Inhale 1 capsule (18 mcg) into the lungs daily Inhale contents of one capsule daily. 90 capsule 3     clopidogrel (PLAVIX) 75 MG tablet Take 1 tablet (75 mg) by mouth daily 90 tablet 1     albuterol (PROAIR HFA/PROVENTIL HFA/VENTOLIN HFA) 108 (90 BASE) MCG/ACT Inhaler Inhale 2 puffs into the lungs every 4 hours as needed 1 Inhaler 11     Bran Fiber 500 MG TABS        Omeprazole (PRILOSEC PO) Take 20 mg by mouth       ORDER FOR DME Equipment being ordered: Oxygen 1 each 0     Cholecalciferol (VITAMIN D) 2000 UNITS tablet Take 2,000 Units by mouth daily       ASPIRIN NOT PRESCRIBED, INTENTIONAL, Antiplatelet medication not prescribed intentionally due to Plavix 0 each      Multiple Vitamin (MULTI-VITAMIN) per tablet Take 1 tablet by mouth daily.       ORDER FOR DME Jamaled walker 1 each 0       Allergies:  -------------  Allergies   Allergen Reactions     No Known Allergies        Social History:  -------------------  Social History     Social History     Marital status: Single     Spouse name: N/A     Number of children: N/A     Years of education: N/A     Occupational History     Not on file.     Social History Main Topics     Smoking status: Former Smoker     Packs/day: 0.50     Years: 52.00     Types: Cigarettes     Quit date: 9/1/2013     Smokeless tobacco: Never Used     Alcohol use 0.0 oz/week     0 Standard drinks or equivalent per week      Comment: rare-- glass of wine     Drug use: No     Sexual activity: Not  Currently     Other Topics Concern     Not on file     Social History Narrative       Family Medical History:  ------------------------------  Family History   Problem Relation Age of Onset     CANCER Mother      D:79  lung ca     CANCER Father      D:  72  bone ca     DIABETES Brother      type 2     Cardiovascular Brother      DIABETES Brother      type 2     CEREBROVASCULAR DISEASE Brother          ROS:  REVIEW OF SYSTEMS:    RESP: positive for cough, dyspnea, wheezing; negative for hemoptysis   CV: negative for chest pain, palpitations, PND, orthopnea  GI: negative for dysphagia, N/V, pain, melena, diarrhea and constipation  NEURO: negative for numbness/tingling, paralysis, incoordination, or focal weakness     OBJECTIVE:                                                    /66  Pulse 98  Temp 97.6  F (36.4  C) (Oral)  Resp 22  Wt 138 lb 14.4 oz (63 kg)  SpO2 (!) 88%  BMI 25.41 kg/m2     GENERAL alert and no distress  EYES:  Normal sclera,conjunctiva, EOMI  HENT: oral and posterior pharynx without lesions or erythema, facies symmetric  NECK: Neck supple. No LAD, without thyroidmegaly or JVD., Carotids without bruits.  RESP: breath sounds quiet but clear, diminished respiratory excursion, prolonged expiratory phase, scattered rhonci, scattered end exp wheezes, fine crackles in both bases.  CV: RRR normal S1S2 without murmurs, rubs or gallops. PMI normal  LYMPH: no cervical lymph adenopathy appreciated  SHOULDER:  Left shoulder has some pain with ROM< but novement of the left shouledr does not reproduce her pain  MS: extremities- no gross deformities of the visible extremities noted, no edema  PSYCH: Alert and oriented times 1; speech- coherent, very poor short term memory, cannot recall many details of recent evetns easily, has lots of help from daughter.       CXR (3/1/18) my prelim read:   Pleural plaques seen previously. No effusion, no new masses. No obvious pnuemonia    Shoulder Xray:  No DJD, no  dislocation    EKG (3/1/18):  Sinus rhythm, low voltage, no acute changes.      LABS:    Component      Latest Ref Rng & Units 3/1/2018   Sodium      133 - 144 mmol/L 141   Potassium      3.4 - 5.3 mmol/L 3.9   Chloride      94 - 109 mmol/L 106   Carbon Dioxide      20 - 32 mmol/L 28   Anion Gap      3 - 14 mmol/L 7   Glucose      70 - 99 mg/dL 88   Urea Nitrogen      7 - 30 mg/dL 14   Creatinine      0.52 - 1.04 mg/dL 0.75   GFR Estimate      >60 mL/min/1.7m2 73   GFR Estimate If Black      >60 mL/min/1.7m2 89   Calcium      8.5 - 10.1 mg/dL 8.8   Bilirubin Total      0.2 - 1.3 mg/dL 0.3   Albumin      3.4 - 5.0 g/dL 3.6   Protein Total      6.8 - 8.8 g/dL 7.8   Alkaline Phosphatase      40 - 150 U/L 89   ALT      0 - 50 U/L 17   AST      0 - 45 U/L 20   WBC      4.0 - 11.0 10e9/L 11.2 (H)   RBC Count      3.8 - 5.2 10e12/L 4.92   Hemoglobin      11.7 - 15.7 g/dL 14.5   Hematocrit      35.0 - 47.0 % 45.6   MCV      78 - 100 fl 93   MCH      26.5 - 33.0 pg 29.5   MCHC      31.5 - 36.5 g/dL 31.8   RDW      10.0 - 15.0 % 16.3 (H)   Platelet Count      150 - 450 10e9/L 273        ASSESSMENT/PLAN:                                                      (R09.1) Pleurisy  (primary encounter diagnosis)  Comment: most liekly referred pleuritic pain from her asbestosis.   Her daughter reports that the patietns  also has severe asbestosis and had identical pain.   NO evidnece for infection, no antibiotics needed.   Her COPD is having an exacerbation, will give prednisone, hopefilly this will help the pleurisy.   Plan: predniSONE (DELTASONE) 10 MG tablet            (J61) Asbestosis (H)  Comment: as above   Plan: predniSONE (DELTASONE) 10 MG tablet            (J43.1) Panlobular emphysema (H)  Comment: having an exacerbation.   Will give prednisone.   O2 ddependent.   COPD seems to be worsening despite current medications, which unfortunatley is expected.   Plan: predniSONE (DELTASONE) 10 MG tablet            (I10) Benign  essential hypertension  Comment: This condition is currently controlled on the current medical regimen.  Continue current therapy.   Plan:     (F01.50) Vascular dementia without behavioral disturbance  Comment: short term memory worsening per daughter.   Patient seems definiteiyl worse to my assessment.    She is in independent living now, but I seriously have concerns about her ability to live independently and manage her medications and help with ADLs, and eating.   I advised the daughter that they should consider assisted living in the near future and will lilekly progress to more longer term care.    I do not expecft this to improve, it will only worsen.   Plan:     (M25.512,  G89.29) Chronic left shoulder pain  Comment: as above   Plan: XR Chest 2 Views, EKG 12-lead complete w/read -        Clinics            (R06.09) ALFORD (dyspnea on exertion)  Comment: COPD   Plan: CBC with platelets, Comprehensive metabolic         panel, XR Chest 2 Views, XR Shoulder Left G/E 3        Views, EKG 12-lead complete w/read - Clinics            (F33.1) Major depressive disorder, recurrent episode, moderate (H)  Comment: stable per frances.   Plan:        See Patient Instructions    LEON BUSTAMANTE M.D., MD  [facility]     Spent greater than 45 minutes with pt, greater than 50% of time was educational and counseling.         Current Outpatient Prescriptions   Medication Sig Dispense Refill     predniSONE (DELTASONE) 10 MG tablet  (Diagnosis:  COPD) 40mg qday x7 day, then 30mg qday x 7 day, then 20mg qday x 7 day, then 10mg qday x 7 day, 5mg po qday X 7 days then D/C 75 tablet 0     simvastatin (ZOCOR) 40 MG tablet   (Diagnosis:  Hyperlipidemia) Take 1 tablet (40 mg) by mouth At Bedtime 90 tablet 3     mirtazapine (REMERON) 15 MG tablet   (Diagnosis:  Depression) Take 1 tablet (15 mg) by mouth At Bedtime 90 tablet 3     metoprolol (TOPROL-XL) 25 MG 24 hr tablet  (Diagnosis:  HTN) Take 1 tablet (25 mg) by mouth  daily 90 tablet 1     citalopram (CELEXA) 20 MG tablet   (Diagnosis:  Depression) Take 1 tablet (20 mg) by mouth daily 90 tablet 1     traZODone (DESYREL) 100 MG tablet   (Diagnosis:  depression) Take 1 tablet (100 mg) by mouth nightly as needed for sleep 90 tablet 1     fluticasone-salmeterol (ADVAIR DISKUS) 250-50 MCG/DOSE diskus inhaler  (Diagnosis:  COPD) Inhale 1 puff into the lungs 2 times daily 3 Inhaler 3     tiotropium (SPIRIVA HANDIHALER) 18 MCG capsule  (Diagnosis:  COPD) Inhale 1 capsule (18 mcg) into the lungs daily Inhale contents of one capsule daily. 90 capsule 3     clopidogrel (PLAVIX) 75 MG tablet  (Diagnosis:  stroke) Take 1 tablet (75 mg) by mouth daily 90 tablet 1     albuterol (PROAIR HFA/PROVENTIL HFA/VENTOLIN HFA) 108 (90 BASE) MCG/ACT Inhaler  (Diagnosis:  COPD) Inhale 2 puffs into the lungs every 4 hours as needed 1 Inhaler 11     Bran Fiber 500 MG TABS    (Diagnosis:  constipation)        Omeprazole (PRILOSEC PO)  (Diagnosis:  GERD) Take 20 mg by mouth       ORDER FOR DME Equipment being ordered: Oxygen 1 each 0     Cholecalciferol (VITAMIN D) 2000 UNITS tablet  (Diagnosis:  Vitamin D deficiency) Take 2,000 Units by mouth daily       ASPIRIN NOT PRESCRIBED, INTENTIONAL, Antiplatelet medication not prescribed intentionally due to Plavix 0 each      Multiple Vitamin (MULTI-VITAMIN) per tablet  (Diagnosis:  COPD) Take 1 tablet by mouth daily.       ORDER FOR DME Wheeled walker 1 each 0

## 2018-03-01 NOTE — MR AVS SNAPSHOT
After Visit Summary   3/1/2018    Maribeth Fowler    MRN: [id number]           Patient Information     Date Of Birth          8/26/1934        Visit Information        Provider Department      3/1/2018 10:00 AM Ronnell Lomax MD [facility]        Today's Diagnoses     Panlobular emphysema (H)    -  1    Benign essential hypertension        Chronic left shoulder pain        ALFORD (dyspnea on exertion)        Asbestosis (H)        Pleurisy        Vascular dementia without behavioral disturbance        Major depressive disorder, recurrent episode, moderate (H)          Care Instructions    *  Pleuritic chest pain from the asbestosis.     *  Labs and xray all look stable.     *  Prednisone:   40 mg per day for one week, then 30 mg once per day for one week, then 20 mg once per day for one week, then 10 mg per da for one week, then 5 mg (1/2 of 10 mg) once per day for one week, then stop.      *  Continue all medications at the same doses.  Contact your usual pharmacy if you need refills.     *  Return to see me in April, sooner if needed.  Call [phone] to schedule this appointment.           Follow-ups after your visit        Who to contact     If you have questions or need follow up information about today's clinic visit or your schedule please contact [facility] directly at [phone].  Normal or non-critical lab and imaging results will be communicated to you by MyChart, letter or phone within 4 business days after the clinic has received the results. If you do not hear from us within 7 days, please contact the clinic through MyChart or phone. If you have a critical or abnormal lab result, we will notify you by phone as soon as possible.  Submit refill requests through [employer] or call your pharmacy and they will forward the refill request to us. Please allow 3 business days for your refill to be completed.          Additional Information  "About Your Visit        [employer] Information     [employer] lets you send messages to your doctor, view your test results, renew your prescriptions, schedule appointments and more. To sign up, go to [email or web address] . Click on \"Log in\" on the left side of the screen, which will take you to the Welcome page. Then click on \"Sign up Now\" on the right side of the page.     You will be asked to enter the access code listed below, as well as some personal information. Please follow the directions to create your username and password.     Your access code is: [email or web address]  Expires: 2018 12:17 PM     Your access code will  in 90 days. If you need help or a new code, please call your [employer] clinic or [phone].        Care EveryWhere ID     This is your Care EveryWhere ID. This could be used by other organizations to access your [employer] medical records  [id number]        Your Vitals Were     Pulse Temperature Respirations Pulse Oximetry BMI (Body Mass Index)       98 97.6  F (36.4  C) (Oral) 22 88% 25.41 kg/m2        Blood Pressure from Last 3 Encounters:   18 110/66   10/23/17 112/76   17 110/84    Weight from Last 3 Encounters:   18 138 lb 14.4 oz (63 kg)   10/23/17 144 lb 12.8 oz (65.7 kg)   17 146 lb 1.6 oz (66.3 kg)              We Performed the Following     CBC with platelets     Comprehensive metabolic panel     EKG 12-lead complete w/read - Clinics          Today's Medication Changes          These changes are accurate as of 3/1/18 12:17 PM.  If you have any questions, ask your nurse or doctor.               Start taking these medicines.        Dose/Directions    predniSONE 10 MG tablet   Commonly known as:  DELTASONE   Used for:  Panlobular emphysema (H), Pleurisy, Asbestosis (H)   Started by:  Ronnell Lomax MD        40mg qday x7 day, then 30mg qday x 7 day, then 20mg qday x 7 day, then 10mg qday x 7 day, 5mg po qday X 7 days then D/C   Quantity:  75 tablet "   Refills:  0            Where to get your medicines      These medications were sent to [id number] - [place], [place] - [address] [facility]  [address] [facility] [address]     Phone:  [phone]     predniSONE 10 MG tablet                Primary Care Provider Office Phone # Fax #    Ronnell Lomax [phone]       [address] [facility] [address]        Equal Access to Services     SOURAV MOLINA : Hadii aad ku hadasho Soomaali, waaxda luqadaha, qaybta kaalmada adeegyada, waxay idiin hayaan adeeg kharamelvin la'matty . So [facility] [phone].    ATENCIÓN: Si habla español, tiene a cunha disposición servicios gratuitos de asistencia lingüística. [facility] [phone].    We comply with applicable federal civil rights laws and [place] laws. We do not discriminate on the basis of race, color, national origin, age, disability, sex, sexual orientation, or gender identity.            Thank you!     Thank you for choosing [facility]  for your care. Our goal is always to provide you with excellent care. Hearing back from our patients is one way we can continue to improve our services. Please take a few minutes to complete the written survey that you may receive in the mail after your visit with us. Thank you!             Your Updated Medication List - Protect others around you: Learn how to safely use, store and throw away your medicines at www.disposemymeds.org.          This list is accurate as of 3/1/18 12:17 PM.  Always use your most recent med list.                   Brand Name Dispense Instructions for use Diagnosis    albuterol 108 (90 BASE) MCG/ACT Inhaler    PROAIR HFA/PROVENTIL HFA/VENTOLIN HFA    1 Inhaler    Inhale 2 puffs into the lungs every 4 hours as needed    Panlobular emphysema (H)       * ASPIRIN NOT PRESCRIBED    INTENTIONAL    0 each    Antiplatelet medication not prescribed intentionally due to Plavix    Other generalized  ischemic cerebrovascular disease       Bran Fiber 500 MG Tabs           citalopram 20 MG tablet    celeXA    90 tablet    Take 1 tablet (20 mg) by mouth daily    Major depressive disorder, recurrent episode, moderate (H)       clopidogrel 75 MG tablet    PLAVIX    90 tablet    Take 1 tablet (75 mg) by mouth daily    Cerebrovascular accident (CVA) due to stenosis of cerebral artery (H)       fluticasone-salmeterol 250-50 MCG/DOSE diskus inhaler    ADVAIR DISKUS    3 Inhaler    Inhale 1 puff into the lungs 2 times daily    Panlobular emphysema (H)       metoprolol succinate 25 MG 24 hr tablet    TOPROL-XL    90 tablet    Take 1 tablet (25 mg) by mouth daily    Benign essential hypertension       mirtazapine 15 MG tablet    REMERON    90 tablet    Take 1 tablet (15 mg) by mouth At Bedtime    Vascular dementia without behavioral disturbance       Multi-vitamin Tabs tablet   Generic drug:  multivitamin, therapeutic with minerals      Take 1 tablet by mouth daily.        * order for DME     1 each    Wheeled walker    Other generalized ischemic cerebrovascular disease, Ataxia due to cerebrovascular disease       order for DME     1 each    Equipment being ordered: Oxygen    Hypoxia, Chronic airway obstruction, not elsewhere classified       predniSONE 10 MG tablet    DELTASONE    75 tablet    40mg qday x7 day, then 30mg qday x 7 day, then 20mg qday x 7 day, then 10mg qday x 7 day, 5mg po qday X 7 days then D/C    Panlobular emphysema (H), Pleurisy, Asbestosis (H)       PRILOSEC PO      Take 20 mg by mouth        simvastatin 40 MG tablet    ZOCOR    90 tablet    Take 1 tablet (40 mg) by mouth At Bedtime    Cerebrovascular accident (CVA) due to stenosis of cerebral artery (H), Hyperlipidemia LDL goal <100       tiotropium 18 MCG capsule    SPIRIVA HANDIHALER    90 capsule    Inhale 1 capsule (18 mcg) into the lungs daily Inhale contents of one capsule daily.    Panlobular emphysema (H)       traZODone 100 MG tablet     DESYREL    90 tablet    Take 1 tablet (100 mg) by mouth nightly as needed for sleep    Primary insomnia       vitamin D 2000 UNITS tablet      Take 2,000 Units by mouth daily    Vitamin D deficiency       * Notice:  This list has 2 medication(s) that are the same as other medications prescribed for you. Read the directions carefully, and ask your doctor or other care provider to review them with you.

## 2018-03-14 NOTE — TELEPHONE ENCOUNTER
Form completed from [facility] and faxed back to FAX: [phone], orig mailed to [address] [place] [address], copy made for chart

## 2018-03-14 NOTE — TELEPHONE ENCOUNTER
completed form for level of assistance for move into assisted living memory care.   Left at [facility]

## 2018-03-15 NOTE — TELEPHONE ENCOUNTER
Reason for Call:  Other call back    Detailed comments: Kelly, a hospice nurse, called to have Dr Lomax confirm that the pt should be in hospice.  Kelly needs to know if the pt's life expectancy is 6 months or less and what the diagnosis is for hospice.  Pt's assisted living created the referral, but Kelly needs further info to initiate the hospice care.    Phone Number Patient can be reached at: Other phone number:  Kelly [phone]    Best Time: 8am-5pm    Can we leave a detailed message on this number? YES    Call taken on 3/15/2018 at 4:13 PM by ELENA HOLT

## 2018-03-16 NOTE — TELEPHONE ENCOUNTER
While she is in a state of decline, I cannot exactly say she has life expectancy under 6 months at this time.      I think palliative care would be better option.     She definitely will indeed need hospice care at some point.      Her diagnosis for eventual hospice care would be COPD, asbestosis, dementia.

## 2018-03-20 NOTE — TELEPHONE ENCOUNTER
Please see interaction with Citalopram sure MD aware but based on protocol wanted approval from MD .Krysten Martinez RN

## 2018-04-04 NOTE — MR AVS SNAPSHOT
After Visit Summary   4/4/2018    Maribeth Fowler    MRN: [id number]           Patient Information     Date Of Birth          8/26/1934        Visit Information        Provider Department      4/4/2018 9:20 AM Ronnell Lomax MD [facility]        Today's Diagnoses     Panlobular emphysema (H)    -  1    Cerebrovascular accident (CVA) due to stenosis of cerebral artery (H)        Major depressive disorder, recurrent episode, moderate (H)        Benign essential hypertension        Asbestosis (H)          Care Instructions      *  You should remain on low dose of prednisone 10 mg once per day until further notice due to the degree of inflammation inside the airways.     *  Trial of more regular use of albuterol nebulizer.       --use albuterol nebulizer 1 vial every morning and in the afternoon (in the place of the Albuterol inhaler)    *  Continue all other medications at the same doses.  Contact your usual pharmacy if you need refills.     *  I would recommend that you consider having the in house care staff assume your care because it will make your life a lot easier.              Follow-ups after your visit        Who to contact     If you have questions or need follow up information about today's clinic visit or your schedule please contact [facility] directly at [phone].  Normal or non-critical lab and imaging results will be communicated to you by MyChart, letter or phone within 4 business days after the clinic has received the results. If you do not hear from us within 7 days, please contact the clinic through MyChart or phone. If you have a critical or abnormal lab result, we will notify you by phone as soon as possible.  Submit refill requests through [employer] or call your pharmacy and they will forward the refill request to us. Please allow 3 business days for your refill to be completed.          Additional Information About Your  "Visit        [employer] Information     [employer] lets you send messages to your doctor, view your test results, renew your prescriptions, schedule appointments and more. To sign up, go to [email or web address] . Click on \"Log in\" on the left side of the screen, which will take you to the Welcome page. Then click on \"Sign up Now\" on the right side of the page.     You will be asked to enter the access code listed below, as well as some personal information. Please follow the directions to create your username and password.     Your access code is: [email or web address]  Expires: 2018  1:17 PM     Your access code will  in 90 days. If you need help or a new code, please call your [facility] clinic or [phone].        Care EveryWhere ID     This is your Care EveryWhere ID. This could be used by other organizations to access your [facility] medical records  [id number]        Your Vitals Were     Pulse Temperature Pulse Oximetry BMI (Body Mass Index)          75 98.1  F (36.7  C) (Oral) 92% 26.32 kg/m2         Blood Pressure from Last 3 Encounters:   18 116/74   18 110/66   10/23/17 112/76    Weight from Last 3 Encounters:   18 143 lb 14.4 oz (65.3 kg)   18 138 lb 14.4 oz (63 kg)   10/23/17 144 lb 12.8 oz (65.7 kg)              Today, you had the following     No orders found for display         Today's Medication Changes          These changes are accurate as of 18 10:39 AM.  If you have any questions, ask your nurse or doctor.               Start taking these medicines.        Dose/Directions    order for DME   Used for:  Panlobular emphysema (H), Asbestosis (H)   Started by:  Ronnell Lomax MD        Equipment being ordered: Oxygen 2 lpm via portable concentrator   Quantity:  1 each   Refills:  0       order for DME   Used for:  Panlobular emphysema (H), Asbestosis (H)   Started by:  Ronnell Lomax MD        Equipment being ordered: Nebulizer   Quantity:  1 each "   Refills:  0       predniSONE 10 MG tablet   Commonly known as:  DELTASONE   Used for:  Panlobular emphysema (H)   Started by:  Ronnell Lomax MD        Dose:  10 mg   Take 1 tablet (10 mg) by mouth daily   Quantity:  30 tablet   Refills:  5         These medicines have changed or have updated prescriptions.        Dose/Directions    * albuterol 108 (90 BASE) MCG/ACT Inhaler   Commonly known as:  PROAIR HFA/PROVENTIL HFA/VENTOLIN HFA   This may have changed:  Another medication with the same name was added. Make sure you understand how and when to take each.   Used for:  Panlobular emphysema (H)   Changed by:  Ronnell Lomax MD        Dose:  2 puff   Inhale 2 puffs into the lungs every 4 hours as needed   Quantity:  1 Inhaler   Refills:  11       * albuterol (2.5 MG/3ML) 0.083% neb solution   This may have changed:  You were already taking a medication with the same name, and this prescription was added. Make sure you understand how and when to take each.   Used for:  Panlobular emphysema (H)   Changed by:  Ronnell Lomax MD        Dose:  1 vial   Take 1 vial (2.5 mg) by nebulization every 6 hours as needed for shortness of breath / dyspnea or wheezing   Quantity:  2 Box   Refills:  11       * Notice:  This list has 2 medication(s) that are the same as other medications prescribed for you. Read the directions carefully, and ask your doctor or other care provider to review them with you.         Where to get your medicines      Some of these will need a paper prescription and others can be bought over the counter.  Ask your nurse if you have questions.     Bring a paper prescription for each of these medications     albuterol (2.5 MG/3ML) 0.083% neb solution    citalopram 20 MG tablet    clopidogrel 75 MG tablet    metoprolol succinate 25 MG 24 hr tablet    order for DME    order for DME    predniSONE 10 MG tablet                Primary Care Provider Office Phone # Fax #    Ronnell  Nathan Lomax [phone]       [address] [facility] [address]        Equal Access to Services     SOURAV MOLINA : Hadsahara samantha truong tal Polk, trish julianathanha, kennedy kaelizabethda vidal, ollie dillonalexandro dickson. So [facility] [phone].    ATENCIÓN: Si habla español, tiene a cunha disposición servicios gratuitos de asistencia lingüística. Llame al [phone].    We comply with applicable federal civil rights laws and [place] laws. We do not discriminate on the basis of race, color, national origin, age, disability, sex, sexual orientation, or gender identity.            Thank you!     Thank you for choosing [facility]  for your care. Our goal is always to provide you with excellent care. Hearing back from our patients is one way we can continue to improve our services. Please take a few minutes to complete the written survey that you may receive in the mail after your visit with us. Thank you!             Your Updated Medication List - Protect others around you: Learn how to safely use, store and throw away your medicines at www.disposemymeds.org.          This list is accurate as of 4/4/18 10:39 AM.  Always use your most recent med list.                   Brand Name Dispense Instructions for use Diagnosis    * albuterol 108 (90 BASE) MCG/ACT Inhaler    PROAIR HFA/PROVENTIL HFA/VENTOLIN HFA    1 Inhaler    Inhale 2 puffs into the lungs every 4 hours as needed    Panlobular emphysema (H)       * albuterol (2.5 MG/3ML) 0.083% neb solution     2 Box    Take 1 vial (2.5 mg) by nebulization every 6 hours as needed for shortness of breath / dyspnea or wheezing    Panlobular emphysema (H)       ASPIRIN NOT PRESCRIBED    INTENTIONAL    0 each    Antiplatelet medication not prescribed intentionally due to Plavix    Other generalized ischemic cerebrovascular disease       Bran Fiber 500 MG Tabs           citalopram 20 MG tablet    celeXA    90 tablet    Take 1  tablet (20 mg) by mouth daily    Major depressive disorder, recurrent episode, moderate (H)       clopidogrel 75 MG tablet    PLAVIX    90 tablet    Take 1 tablet (75 mg) by mouth daily    Cerebrovascular accident (CVA) due to stenosis of cerebral artery (H)       fluticasone-salmeterol 250-50 MCG/DOSE diskus inhaler    ADVAIR DISKUS    3 Inhaler    Inhale 1 puff into the lungs 2 times daily    Panlobular emphysema (H)       metoprolol succinate 25 MG 24 hr tablet    TOPROL-XL    90 tablet    Take 1 tablet (25 mg) by mouth daily    Benign essential hypertension       mirtazapine 15 MG tablet    REMERON    90 tablet    Take 1 tablet (15 mg) by mouth At Bedtime    Vascular dementia without behavioral disturbance       Multi-vitamin Tabs tablet   Generic drug:  multivitamin, therapeutic with minerals      Take 1 tablet by mouth daily.        order for DME     1 each    Equipment being ordered: Oxygen 2 lpm via portable concentrator    Panlobular emphysema (H), Asbestosis (H)       order for DME     1 each    Equipment being ordered: Nebulizer    Panlobular emphysema (H), Asbestosis (H)       predniSONE 10 MG tablet    DELTASONE    30 tablet    Take 1 tablet (10 mg) by mouth daily    Panlobular emphysema (H)       PRILOSEC PO      Take 20 mg by mouth        simvastatin 40 MG tablet    ZOCOR    90 tablet    Take 1 tablet (40 mg) by mouth At Bedtime    Cerebrovascular accident (CVA) due to stenosis of cerebral artery (H), Hyperlipidemia LDL goal <100       tiotropium 18 MCG capsule    SPIRIVA HANDIHALER    90 capsule    Inhale 1 capsule (18 mcg) into the lungs daily Inhale contents of one capsule daily.    Panlobular emphysema (H)       traZODone 100 MG tablet    DESYREL    90 tablet    Take 1 tablet (100 mg) by mouth nightly as needed for sleep    Primary insomnia       vitamin D 2000 UNITS tablet      Take 2,000 Units by mouth daily    Vitamin D deficiency       * Notice:  This list has 2 medication(s) that are the same  as other medications prescribed for you. Read the directions carefully, and ask your doctor or other care provider to review them with you.

## 2018-04-04 NOTE — PATIENT INSTRUCTIONS
*  You should remain on low dose of prednisone 10 mg once per day until further notice due to the degree of inflammation inside the airways.     *  Trial of more regular use of albuterol nebulizer.       --use albuterol nebulizer 1 vial every morning and in the afternoon (in the place of the Albuterol inhaler)    *  Continue all other medications at the same doses.  Contact your usual pharmacy if you need refills.     *  I would recommend that you consider having the in house care staff assume your care because it will make your life a lot easier.

## 2018-04-04 NOTE — PROGRESS NOTES
SUBJECTIVE:   Maribeth Fowler is a 83 year old female who presents to clinic today for the following health issues:    Daughter has question about concentrated portable O2 and if pt would benefit from this. She does not get out much.    COPD Follow-Up    Symptoms are currently: slightly worsened    Current fatigue or dyspnea with ambulation: pt states that the prednisone has not helped very much. Has noticed a little relief but still very SOB    Shortness of breath: slightly worsened    Increased or change in Cough/Sputum: No    Fever(s): No    Baseline ambulation without stopping to rest:  20 feet. Able to walk up 0 flights of stairs without stopping to rest.    Any ER/UC or hospital admissions since your last visit? No     History   Smoking Status     Former Smoker     Packs/day: 0.50     Years: 52.00     Types: Cigarettes     Quit date: 9/1/2013   Smokeless Tobacco     Never Used     No results found for: FEV1, [id number]    Amount of exercise or physical activity: None    Problems taking medications regularly: No    Medication side effects: none    Diet: regular (no restrictions)      Incontinence      Duration: last 6 months    Description (location/character/radiation): has noticed incontinence problems for past 6 months. urinary and bowel movements.     Intensity:  moderate    Accompanying signs and symptoms: mainly urinary but sometimes has bowel accidents because she cannot get to bathroom fast enough    History (similar episodes/previous evaluation): None    Precipitating or alleviating factors: None    Therapies tried and outcome: pt uses depends. Pt's daughter states she she doesn't know if pt is cognitive of incontinence or not.           Problem list and histories reviewed & adjusted, as indicated.  Additional history: as documented        Reviewed and updated as needed this visit by clinical staff  Tobacco  Allergies  Meds       Reviewed and updated as needed this visit by Provider           Past  Medical History:  ---------------------------  Past Medical History:   Diagnosis Date     Arthritis      Asbestosis(501)      Calculus of kidney      Chronic airway obstruction, not elsewhere classified      Chronic airway obstruction, not elsewhere classified      Depressive disorder, not elsewhere classified      Esophageal reflux      Essential hypertension, benign      Gastro-oesophageal reflux disease      Other and unspecified hyperlipidemia 10/04     prior to treatment     Other convulsions 2004    seizure since stroke     Other generalized ischemic cerebrovascular disease 2004    acute right thalamic infarct per MRI     Stroke (H) 2004    acute right thalamic infarct per MRI     Tobacco use disorder      Unspecified arthropathy, pelvic region and thigh      Unspecified cerebral artery occlusion with cerebral infarction      Vascular dementia     per Neurology evaluation     Vitamin D deficiency 9/12/12    vitamin D 25       Past Surgical History:  ---------------------------  Past Surgical History:   Procedure Laterality Date     ARTHROPLASTY HIP  4/23/2012    Procedure:ARTHROPLASTY HIP; RIGHT TOTAL HIP ARTHROPLASTY ; Surgeon:YONIS DUFFY; Location: OR     BACK SURGERY      cervical spine repair     C NONSPECIFIC PROCEDURE      T&A     C NONSPECIFIC PROCEDURE      urethral stone removal     C NONSPECIFIC PROCEDURE  6/99    Left hip JESUS     HYSTERECTOMY, CERVIX STATUS UNKNOWN      partial hysterectomy (secondary to fibroids)     HYSTERECTOMY, PAP NO LONGER INDICATED         Current Medications:  ---------------------------  Current Outpatient Prescriptions   Medication Sig Dispense Refill     traZODone (DESYREL) 100 MG tablet Take 1 tablet (100 mg) by mouth nightly as needed for sleep 90 tablet 0     predniSONE (DELTASONE) 10 MG tablet 40mg qday x7 day, then 30mg qday x 7 day, then 20mg qday x 7 day, then 10mg qday x 7 day, 5mg po qday X 7 days then D/C 75 tablet 0     simvastatin  (ZOCOR) 40 MG tablet Take 1 tablet (40 mg) by mouth At Bedtime 90 tablet 3     mirtazapine (REMERON) 15 MG tablet Take 1 tablet (15 mg) by mouth At Bedtime 90 tablet 3     metoprolol (TOPROL-XL) 25 MG 24 hr tablet Take 1 tablet (25 mg) by mouth daily 90 tablet 1     citalopram (CELEXA) 20 MG tablet Take 1 tablet (20 mg) by mouth daily 90 tablet 1     fluticasone-salmeterol (ADVAIR DISKUS) 250-50 MCG/DOSE diskus inhaler Inhale 1 puff into the lungs 2 times daily 3 Inhaler 3     tiotropium (SPIRIVA HANDIHALER) 18 MCG capsule Inhale 1 capsule (18 mcg) into the lungs daily Inhale contents of one capsule daily. 90 capsule 3     clopidogrel (PLAVIX) 75 MG tablet Take 1 tablet (75 mg) by mouth daily 90 tablet 1     albuterol (PROAIR HFA/PROVENTIL HFA/VENTOLIN HFA) 108 (90 BASE) MCG/ACT Inhaler Inhale 2 puffs into the lungs every 4 hours as needed 1 Inhaler 11     Bran Fiber 500 MG TABS        Omeprazole (PRILOSEC PO) Take 20 mg by mouth       Cholecalciferol (VITAMIN D) 2000 UNITS tablet Take 2,000 Units by mouth daily       ASPIRIN NOT PRESCRIBED, INTENTIONAL, Antiplatelet medication not prescribed intentionally due to Plavix 0 each      Multiple Vitamin (MULTI-VITAMIN) per tablet Take 1 tablet by mouth daily.         Allergies:  -------------  Allergies   Allergen Reactions     No Known Allergies        Social History:  -------------------  Social History     Social History     Marital status: Single     Spouse name: N/A     Number of children: N/A     Years of education: N/A     Occupational History     Not on file.     Social History Main Topics     Smoking status: Former Smoker     Packs/day: 0.50     Years: 52.00     Types: Cigarettes     Quit date: 9/1/2013     Smokeless tobacco: Never Used     Alcohol use 0.0 oz/week     0 Standard drinks or equivalent per week      Comment: rare-- glass of wine     Drug use: No     Sexual activity: Not Currently     Other Topics Concern     Not on file     Social History Narrative        Family Medical History:  ------------------------------  Family History   Problem Relation Age of Onset     CANCER Mother      D:79  lung ca     CANCER Father      D:  72  bone ca     DIABETES Brother      type 2     Cardiovascular Brother      DIABETES Brother      type 2     CEREBROVASCULAR DISEASE Brother          ROS:  REVIEW OF SYSTEMS:    RESP: positive for cough, dyspnea, wheezing; negative for hemoptysis   CV: negative for chest pain, palpitations, PND, ALFORD, orthopnea)  GI: negative for dysphagia, N/V, pain, melena, diarrhea and constipation  NEURO: negative for numbness/tingling, paralysis, incoordination, or focal weakness     OBJECTIVE:                                                    /74  Pulse 75  Temp 98.1  F (36.7  C) (Oral)  Wt 143 lb 14.4 oz (65.3 kg)  SpO2 92%  BMI 26.32 kg/m2     GENERAL alert and no distress  EYES:  Normal sclera,conjunctiva, EOMI  HENT: oral and posterior pharynx without lesions or erythema, facies symmetric  NECK: Neck supple. No LAD, without thyroidmegaly or JVD., Carotids without bruits.  RESP: breath sounds quiet but clear, diminished respiratory excursion, prolonged expiratory phase,  rhonci, wheezes, no rales   CV: RRR normal S1S2 without murmurs, rubs or gallops. PMI normal  LYMPH: no cervical lymph adenopathy appreciated  MS: extremities- no gross deformities of the visible extremities noted, no edema  PSYCH: Alert and oriented times 3; speech- coherent  SKIN:  No obvious significant skin lesions on visible portions of face          ASSESSMENT/PLAN:                                                      (J43.1) Panlobular emphysema (H)  (primary encounter diagnosis)  Comment: ongoin issues, poor disease  Requires O2 continually.   Will more than likely needs chronic steroids at this oint.   Seems to have return of worsened breathign every time she has bene off steroids.   Start low dose predniosne 10 mg ocne daily.   Also may benefit frmo more regular  use of nebulizer, discussed them getting nebulizer machine for reuglar use.   Will need to coordinate with new living arrangemnts when she moves into Assisted living.   Plan: order for DME, order for DME, DISCONTINUED:         albuterol (2.5 MG/3ML) 0.083% neb solution,         DISCONTINUED: predniSONE (DELTASONE) 10 MG         tablet, DISCONTINUED: predniSONE (DELTASONE) 10        MG tablet, DISCONTINUED: albuterol (2.5 MG/3ML)        0.083% neb solution              *  You should remain on low dose of prednisone 10 mg once per day until further notice due to the degree of inflammation inside the airways.     *  Trial of more regular use of albuterol nebulizer.       --use albuterol nebulizer 1 vial every morning and in the afternoon (in the place of the Albuterol inhaler)    *  Continue all other medications at the same doses.  Contact your usual pharmacy if you need refills.     *  I would recommend that you consider having the in house care staff assume your care because it will make your life a lot easier.        (I63.50) Cerebrovascular accident (CVA) due to stenosis of cerebral artery (H)  Comment: This condition is currently controlled on the current medical regimen.  Continue current therapy.   Discussed secondary risk factor modification and recommended continuing aggressive management of these items.   Plan: clopidogrel (PLAVIX) 75 MG tablet            (F33.1) Major depressive disorder, recurrent episode, moderate (H)  Comment: This condition is currently controlled on the current medical regimen.  Continue current therapy.   Plan: citalopram (CELEXA) 20 MG tablet            (I10) Benign essential hypertension  Comment: This condition is currently controlled on the current medical regimen.  Continue current therapy.   Discussed current hypertension treatment guidelines, including indications for treatment and treatment options.  Discussed the importance for aggressive management of HTN to prevent vascular  complications later.  Recommended lower fat, lower carbohydrate, and lower sodium (<2000 mg)diet.  Discussed required intervals for follow up on HTN, lab studies.  Recommened pt. follow their blood pressures outside the clinic to ensure that BPs are remaining within guidelines, and to contact me if the readings are not within guidelines on a regular basis so we can adjust treatment as needed.   Plan: metoprolol succinate (TOPROL-XL) 25 MG 24 hr         tablet            (J61) Asbestosis (H)  Comment:   Plan: order for DME, order for DME             See Patient Instructions    LEON BUSTAMANTE M.D., MD  [facility]

## 2018-04-10 NOTE — TELEPHONE ENCOUNTER
Med list updated  Please fax RXs to them as listed.    Hand signed RX brought to [facility].     Also, the patient and her daughter were interested in having the [facility] Nurse practitioner team or other house MD take over care for her since it is going to be easier for the patient because she does not transfer as easily.    Ok to have that team assume care for the patient whenever it can be arranged.   Contact us until then with any questions.

## 2018-04-10 NOTE — TELEPHONE ENCOUNTER
Henna from [facility] calling again.  They need an RX for the fiber that patient is also taking.  Is listed as historical on med list.   Please fax orders to Riya at [phone]

## 2018-04-10 NOTE — TELEPHONE ENCOUNTER
Kaylynn from [facility] calling again, they also need a signed RX for the albuterol inhaler and the prednisone.  Please fax to the number below along with the tylenol and omeprazole.      Did relay message below to nurse and she states that [facility] NP will see patient but that service will not begin until May 2nd.

## 2018-04-10 NOTE — TELEPHONE ENCOUNTER
Hand signed RX brought to [facility] for prednisone and albuterol inhaler.     At her last visit, the patient and her daughter also were interested in using a albuterol nebulizer if needed because she felt it worked better for her.   I also will include a prescription for the albuterol nebulizer if needed.   They may have a different protocol for the nebulizer.

## 2018-04-10 NOTE — TELEPHONE ENCOUNTER
5 rxs'  Prednisone 10mg, albuterol neb solution,  proair 108 (90 base) tylenol 500mg, & omeprazole faxed to [facility] at FAX: [phone]   Called daughter Ghada and she bought Maribeth (her mother) the machine for the neb sol

## 2018-04-10 NOTE — TELEPHONE ENCOUNTER
"Riya from [address] [place], [place] [address]ek calling regarding pt's med list. Facility will now be giving pt medications and needs clarification. On facility med list they have acetaminophen 500mg 1-2 tabs PRN and Omeprzole 20mg. Pt is taking acetaminophen 500mg TID PRN and Omeprazole 20mg QD. They will need Rx's for these as they are now to be given from the facility. They also cannot accept a \"range\" in dosage for tylenol (ex 500-1000mg PRN), needs to have dose and how frequent. Pt says that 500mg-1000mg is not enough for her.  Please fax orders to Riya at [phone]  "

## 2018-04-19 NOTE — TELEPHONE ENCOUNTER
Patients daughter called says mom is in quite a bit of pain could he prescribe something that last longer than tylenol or something she could have more than twice a day to manage her pain please call ray phone [phone] fax number for [facility] where patient is at is [phone] fax number any questions please call daughter ray regarding this

## 2018-04-19 NOTE — TELEPHONE ENCOUNTER
Spoke with Daughter and Patient is having a lot of pain in her shoulders to the point where patient can not move. Daughter would like to know if something else can be given or if she can have the tylenol 3 times a day instead of just the two times a day. Daughter would like to wait to see what Dr Lomax wants done and knows he is out for the rest of the day.   Patient can not move due to how much pain she is in from breathing. Your thoughts about this??

## 2018-04-20 NOTE — TELEPHONE ENCOUNTER
Pt's daughter would like the message faxed over to [facility]. Will have PCP sign and then fax.

## 2018-05-22 NOTE — TELEPHONE ENCOUNTER
Multiple Vitamin (MULTI-VITAMIN) per tablet , Cholecalciferol (VITAMIN D) 2000 UNITS tablet    Last Written Prescription Date:  0  Last Fill Quantity: 0,   # refills: 0  Last Office Visit: 4/04/2018  Future Office visit:       Routing refill request to provider for review/approval because:  Medication is reported/historical

## 2018-05-22 NOTE — TELEPHONE ENCOUNTER
"Requested Prescriptions   Pending Prescriptions Disp Refills     VITAMIN D3 1000 units tablet [Pharmacy Med Name: VITAMIN D3 TAB 1000UNIT]  11     Sig: TAKE TWO TABLETS (2000 UNITS) BY MOUTH ONCE DAILY    Vitamin Supplements (Adult) Protocol Passed    5/21/2018  7:44 PM       Passed - High dose Vitamin D not ordered       Passed - Recent (12 mo) or future (30 days) visit within the authorizing provider's specialty    Patient had office visit in the last 12 months or has a visit in the next 30 days with authorizing provider or within the authorizing provider's specialty.  See \"Patient Info\" tab in inbasket, or \"Choose Columns\" in Meds & Orders section of the refill encounter.            MAPAP 500 MG tablet [Pharmacy Med Name: MAPAP TAB 500MG]  11     Sig: TAKE TWO TABLETS (1000MG) BY MOUTH THREE TIMES DAILY    Analgesics (Non-Narcotic Tylenol and ASA Only) Passed    5/21/2018  7:44 PM       Passed - Recent (12 mo) or future (30 days) visit within the authorizing provider's specialty    Patient had office visit in the last 12 months or has a visit in the next 30 days with authorizing provider or within the authorizing provider's specialty.  See \"Patient Info\" tab in inbasket, or \"Choose Columns\" in Meds & Orders section of the refill encounter.           Passed - Patient is 7 months old or older    If patient is a peds patient of the age 7 mos -12 years, ok to refill using weight-based dosing.     If >3g daily and/or sig is not \"prn\", check for liver enzymes. If normal in the last year, ok to refill.  If not, refer to the provider.          mirtazapine (REMERON) 15 MG tablet [Pharmacy Med Name: MIRTAZAPINE TAB 15MG]  Last Written Prescription Date:  10/23/2017  Last Fill Quantity: 90,  # refills: 3  Last office visit: 4/4/2018 with prescribing provider:  4/04/2018   Future Office Visit:      11     Sig: TAKE 1 TABLET BY MOUTH ONCE DAILY    Atypical Antidepressants Protocol Passed    5/21/2018  7:44 PM       Passed - " "Recent (12 mo) or future (30 days) visit within the authorizing provider's specialty    Patient had office visit in the last 12 months or has a visit in the next 30 days with authorizing provider or within the authorizing provider's specialty.  See \"Patient Info\" tab in inbasket, or \"Choose Columns\" in Meds & Orders section of the refill encounter.           Passed - Patient is age 18 or older       Passed - No active pregnancy on record       Passed - No positive pregnancy test in past 12 mos        Multiple Vitamin (TAB-A-DIONE) TABS [Pharmacy Med Name: TAB-A-DIONE TAB]  11     Sig: TAKE 1 TABLET BY MOUTH ONCE DAILY    Vitamin Supplements (Adult) Protocol Passed    5/21/2018  7:44 PM       Passed - High dose Vitamin D not ordered       Passed - Recent (12 mo) or future (30 days) visit within the authorizing provider's specialty    Patient had office visit in the last 12 months or has a visit in the next 30 days with authorizing provider or within the authorizing provider's specialty.  See \"Patient Info\" tab in inbasket, or \"Choose Columns\" in Meds & Orders section of the refill encounter.            simvastatin (ZOCOR) 40 MG tablet [Pharmacy Med Name: SIMVASTATIN TAB 40MG]  Last Written Prescription Date:  10/23/2017  Last Fill Quantity: 90,  # refills: 3   Last office visit: 4/4/2018 with prescribing provider:  4/04/2018   Future Office Visit:      11     Sig: TAKE 1 TABLET BY MOUTH ONCE DAILY    Statins Protocol Passed    5/21/2018  7:44 PM       Passed - LDL on file in past 12 months    Recent Labs   Lab Test  10/23/17   0830   LDL  56            Passed - No abnormal creatine kinase in past 12 months    No lab results found.            Passed - Recent (12 mo) or future (30 days) visit within the authorizing provider's specialty    Patient had office visit in the last 12 months or has a visit in the next 30 days with authorizing provider or within the authorizing provider's specialty.  See \"Patient Info\" tab in " "inbasket, or \"Choose Columns\" in Meds & Orders section of the refill encounter.           Passed - Patient is age 18 or older       Passed - No active pregnancy on record       Passed - No positive pregnancy test in past 12 months          "